# Patient Record
Sex: MALE | Race: OTHER | HISPANIC OR LATINO
[De-identification: names, ages, dates, MRNs, and addresses within clinical notes are randomized per-mention and may not be internally consistent; named-entity substitution may affect disease eponyms.]

---

## 2017-02-16 ENCOUNTER — TRANSCRIPTION ENCOUNTER (OUTPATIENT)
Age: 57
End: 2017-02-16

## 2019-12-27 VITALS
TEMPERATURE: 99 F | WEIGHT: 196.43 LBS | RESPIRATION RATE: 16 BRPM | HEART RATE: 103 BPM | DIASTOLIC BLOOD PRESSURE: 96 MMHG | OXYGEN SATURATION: 97 % | SYSTOLIC BLOOD PRESSURE: 179 MMHG

## 2019-12-27 RX ORDER — KETOROLAC TROMETHAMINE 30 MG/ML
30 SYRINGE (ML) INJECTION ONCE
Refills: 0 | Status: DISCONTINUED | OUTPATIENT
Start: 2019-12-27 | End: 2019-12-27

## 2019-12-27 RX ADMIN — Medication 30 MILLIGRAM(S): at 23:44

## 2019-12-27 RX ADMIN — Medication 100 MILLIGRAM(S): at 23:44

## 2019-12-27 NOTE — ED ADULT NURSE NOTE - PMH
Chronic obstructive pulmonary disease  COPD (chronic obstructive pulmonary disease)  Depressive disorder  Depressive disorder  Essential hypertension  HTN (hypertension)  Hepatitis C virus infection  Hepatitis C  Impotence of organic origin  ED (erectile dysfunction)  Obstructive sleep apnea syndrome  Obstructive sleep apnea  Type 2 diabetes mellitus  DM (diabetes mellitus)

## 2019-12-27 NOTE — ED ADULT NURSE NOTE - OBJECTIVE STATEMENT
pt c/o pain to L foot (heel area) and redness to bottom of foot along with swelling. 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 Pt noticed redness, pain, and swelling that started yesterday and became worse today

## 2019-12-28 ENCOUNTER — INPATIENT (INPATIENT)
Facility: HOSPITAL | Age: 59
LOS: 0 days | Discharge: HOME | DRG: 575 | End: 2019-12-28
Attending: STUDENT IN AN ORGANIZED HEALTH CARE EDUCATION/TRAINING PROGRAM | Admitting: STUDENT IN AN ORGANIZED HEALTH CARE EDUCATION/TRAINING PROGRAM
Payer: COMMERCIAL

## 2019-12-28 ENCOUNTER — TRANSCRIPTION ENCOUNTER (OUTPATIENT)
Age: 59
End: 2019-12-28

## 2019-12-28 VITALS
OXYGEN SATURATION: 98 % | RESPIRATION RATE: 18 BRPM | DIASTOLIC BLOOD PRESSURE: 69 MMHG | HEART RATE: 77 BPM | SYSTOLIC BLOOD PRESSURE: 123 MMHG | TEMPERATURE: 98 F

## 2019-12-28 DIAGNOSIS — I10 ESSENTIAL (PRIMARY) HYPERTENSION: ICD-10-CM

## 2019-12-28 DIAGNOSIS — J44.9 CHRONIC OBSTRUCTIVE PULMONARY DISEASE, UNSPECIFIED: ICD-10-CM

## 2019-12-28 DIAGNOSIS — R63.8 OTHER SYMPTOMS AND SIGNS CONCERNING FOOD AND FLUID INTAKE: ICD-10-CM

## 2019-12-28 DIAGNOSIS — Z91.89 OTHER SPECIFIED PERSONAL RISK FACTORS, NOT ELSEWHERE CLASSIFIED: ICD-10-CM

## 2019-12-28 DIAGNOSIS — L02.91 CUTANEOUS ABSCESS, UNSPECIFIED: ICD-10-CM

## 2019-12-28 DIAGNOSIS — Z29.9 ENCOUNTER FOR PROPHYLACTIC MEASURES, UNSPECIFIED: ICD-10-CM

## 2019-12-28 DIAGNOSIS — B19.20 UNSPECIFIED VIRAL HEPATITIS C WITHOUT HEPATIC COMA: ICD-10-CM

## 2019-12-28 DIAGNOSIS — E11.9 TYPE 2 DIABETES MELLITUS WITHOUT COMPLICATIONS: ICD-10-CM

## 2019-12-28 DIAGNOSIS — M79.5 RESIDUAL FOREIGN BODY IN SOFT TISSUE: ICD-10-CM

## 2019-12-28 LAB
ALBUMIN SERPL ELPH-MCNC: 3.5 G/DL — SIGNIFICANT CHANGE UP (ref 3.3–5)
ALP SERPL-CCNC: 120 U/L — SIGNIFICANT CHANGE UP (ref 40–120)
ALT FLD-CCNC: 14 U/L — SIGNIFICANT CHANGE UP (ref 10–45)
ANION GAP SERPL CALC-SCNC: 12 MMOL/L — SIGNIFICANT CHANGE UP (ref 5–17)
ANION GAP SERPL CALC-SCNC: 17 MMOL/L — SIGNIFICANT CHANGE UP (ref 5–17)
AST SERPL-CCNC: 22 U/L — SIGNIFICANT CHANGE UP (ref 10–40)
BASOPHILS # BLD AUTO: 0.03 K/UL — SIGNIFICANT CHANGE UP (ref 0–0.2)
BASOPHILS NFR BLD AUTO: 0.3 % — SIGNIFICANT CHANGE UP (ref 0–2)
BILIRUB SERPL-MCNC: 0.3 MG/DL — SIGNIFICANT CHANGE UP (ref 0.2–1.2)
BUN SERPL-MCNC: 23 MG/DL — SIGNIFICANT CHANGE UP (ref 7–23)
BUN SERPL-MCNC: 26 MG/DL — HIGH (ref 7–23)
CALCIUM SERPL-MCNC: 8.7 MG/DL — SIGNIFICANT CHANGE UP (ref 8.4–10.5)
CALCIUM SERPL-MCNC: 8.8 MG/DL — SIGNIFICANT CHANGE UP (ref 8.4–10.5)
CHLORIDE SERPL-SCNC: 100 MMOL/L — SIGNIFICANT CHANGE UP (ref 96–108)
CHLORIDE SERPL-SCNC: 103 MMOL/L — SIGNIFICANT CHANGE UP (ref 96–108)
CO2 SERPL-SCNC: 20 MMOL/L — LOW (ref 22–31)
CO2 SERPL-SCNC: 22 MMOL/L — SIGNIFICANT CHANGE UP (ref 22–31)
CREAT SERPL-MCNC: 1.18 MG/DL — SIGNIFICANT CHANGE UP (ref 0.5–1.3)
CREAT SERPL-MCNC: 1.22 MG/DL — SIGNIFICANT CHANGE UP (ref 0.5–1.3)
CRP SERPL-MCNC: 10.49 MG/DL — HIGH (ref 0–0.4)
EOSINOPHIL # BLD AUTO: 0.16 K/UL — SIGNIFICANT CHANGE UP (ref 0–0.5)
EOSINOPHIL NFR BLD AUTO: 1.5 % — SIGNIFICANT CHANGE UP (ref 0–6)
ERYTHROCYTE [SEDIMENTATION RATE] IN BLOOD: 53 MM/HR — HIGH
GLUCOSE BLDC GLUCOMTR-MCNC: 200 MG/DL — HIGH (ref 70–99)
GLUCOSE BLDC GLUCOMTR-MCNC: 220 MG/DL — HIGH (ref 70–99)
GLUCOSE BLDC GLUCOMTR-MCNC: 401 MG/DL — HIGH (ref 70–99)
GLUCOSE BLDC GLUCOMTR-MCNC: 84 MG/DL — SIGNIFICANT CHANGE UP (ref 70–99)
GLUCOSE SERPL-MCNC: 219 MG/DL — HIGH (ref 70–99)
GLUCOSE SERPL-MCNC: 394 MG/DL — HIGH (ref 70–99)
HCT VFR BLD CALC: 33.9 % — LOW (ref 39–50)
HCT VFR BLD CALC: 36.9 % — LOW (ref 39–50)
HCV AB S/CO SERPL IA: 10.82 S/CO — SIGNIFICANT CHANGE UP
HCV AB SERPL-IMP: REACTIVE
HGB BLD-MCNC: 11.5 G/DL — LOW (ref 13–17)
HGB BLD-MCNC: 12.5 G/DL — LOW (ref 13–17)
IMM GRANULOCYTES NFR BLD AUTO: 0.3 % — SIGNIFICANT CHANGE UP (ref 0–1.5)
LYMPHOCYTES # BLD AUTO: 1.81 K/UL — SIGNIFICANT CHANGE UP (ref 1–3.3)
LYMPHOCYTES # BLD AUTO: 16.7 % — SIGNIFICANT CHANGE UP (ref 13–44)
MAGNESIUM SERPL-MCNC: 2.2 MG/DL — SIGNIFICANT CHANGE UP (ref 1.6–2.6)
MCHC RBC-ENTMCNC: 29.4 PG — SIGNIFICANT CHANGE UP (ref 27–34)
MCHC RBC-ENTMCNC: 29.8 PG — SIGNIFICANT CHANGE UP (ref 27–34)
MCHC RBC-ENTMCNC: 33.9 GM/DL — SIGNIFICANT CHANGE UP (ref 32–36)
MCHC RBC-ENTMCNC: 33.9 GM/DL — SIGNIFICANT CHANGE UP (ref 32–36)
MCV RBC AUTO: 86.8 FL — SIGNIFICANT CHANGE UP (ref 80–100)
MCV RBC AUTO: 87.8 FL — SIGNIFICANT CHANGE UP (ref 80–100)
MONOCYTES # BLD AUTO: 0.74 K/UL — SIGNIFICANT CHANGE UP (ref 0–0.9)
MONOCYTES NFR BLD AUTO: 6.8 % — SIGNIFICANT CHANGE UP (ref 2–14)
NEUTROPHILS # BLD AUTO: 8.06 K/UL — HIGH (ref 1.8–7.4)
NEUTROPHILS NFR BLD AUTO: 74.4 % — SIGNIFICANT CHANGE UP (ref 43–77)
NRBC # BLD: 0 /100 WBCS — SIGNIFICANT CHANGE UP (ref 0–0)
NRBC # BLD: 0 /100 WBCS — SIGNIFICANT CHANGE UP (ref 0–0)
PLATELET # BLD AUTO: 217 K/UL — SIGNIFICANT CHANGE UP (ref 150–400)
PLATELET # BLD AUTO: 225 K/UL — SIGNIFICANT CHANGE UP (ref 150–400)
POTASSIUM SERPL-MCNC: 3.9 MMOL/L — SIGNIFICANT CHANGE UP (ref 3.5–5.3)
POTASSIUM SERPL-MCNC: 4.4 MMOL/L — SIGNIFICANT CHANGE UP (ref 3.5–5.3)
POTASSIUM SERPL-SCNC: 3.9 MMOL/L — SIGNIFICANT CHANGE UP (ref 3.5–5.3)
POTASSIUM SERPL-SCNC: 4.4 MMOL/L — SIGNIFICANT CHANGE UP (ref 3.5–5.3)
PROT SERPL-MCNC: 6.9 G/DL — SIGNIFICANT CHANGE UP (ref 6–8.3)
RBC # BLD: 3.86 M/UL — LOW (ref 4.2–5.8)
RBC # BLD: 4.25 M/UL — SIGNIFICANT CHANGE UP (ref 4.2–5.8)
RBC # FLD: 12.3 % — SIGNIFICANT CHANGE UP (ref 10.3–14.5)
RBC # FLD: 12.5 % — SIGNIFICANT CHANGE UP (ref 10.3–14.5)
SODIUM SERPL-SCNC: 137 MMOL/L — SIGNIFICANT CHANGE UP (ref 135–145)
SODIUM SERPL-SCNC: 137 MMOL/L — SIGNIFICANT CHANGE UP (ref 135–145)
WBC # BLD: 10.83 K/UL — HIGH (ref 3.8–10.5)
WBC # BLD: 9.56 K/UL — SIGNIFICANT CHANGE UP (ref 3.8–10.5)
WBC # FLD AUTO: 10.83 K/UL — HIGH (ref 3.8–10.5)
WBC # FLD AUTO: 9.56 K/UL — SIGNIFICANT CHANGE UP (ref 3.8–10.5)

## 2019-12-28 PROCEDURE — 85027 COMPLETE CBC AUTOMATED: CPT

## 2019-12-28 PROCEDURE — 86140 C-REACTIVE PROTEIN: CPT

## 2019-12-28 PROCEDURE — 82962 GLUCOSE BLOOD TEST: CPT

## 2019-12-28 PROCEDURE — 99283 EMERGENCY DEPT VISIT LOW MDM: CPT

## 2019-12-28 PROCEDURE — 99285 EMERGENCY DEPT VISIT HI MDM: CPT | Mod: 25

## 2019-12-28 PROCEDURE — 83036 HEMOGLOBIN GLYCOSYLATED A1C: CPT

## 2019-12-28 PROCEDURE — 73620 X-RAY EXAM OF FOOT: CPT

## 2019-12-28 PROCEDURE — 85025 COMPLETE CBC W/AUTO DIFF WBC: CPT

## 2019-12-28 PROCEDURE — 73630 X-RAY EXAM OF FOOT: CPT | Mod: 26

## 2019-12-28 PROCEDURE — 36415 COLL VENOUS BLD VENIPUNCTURE: CPT

## 2019-12-28 PROCEDURE — 87521 HEPATITIS C PROBE&RVRS TRNSC: CPT

## 2019-12-28 PROCEDURE — 80048 BASIC METABOLIC PNL TOTAL CA: CPT

## 2019-12-28 PROCEDURE — 87040 BLOOD CULTURE FOR BACTERIA: CPT

## 2019-12-28 PROCEDURE — 73620 X-RAY EXAM OF FOOT: CPT | Mod: 26,LT

## 2019-12-28 PROCEDURE — 96375 TX/PRO/DX INJ NEW DRUG ADDON: CPT

## 2019-12-28 PROCEDURE — 83735 ASSAY OF MAGNESIUM: CPT

## 2019-12-28 PROCEDURE — 73630 X-RAY EXAM OF FOOT: CPT

## 2019-12-28 PROCEDURE — 80053 COMPREHEN METABOLIC PANEL: CPT

## 2019-12-28 PROCEDURE — 90471 IMMUNIZATION ADMIN: CPT

## 2019-12-28 PROCEDURE — 90715 TDAP VACCINE 7 YRS/> IM: CPT

## 2019-12-28 PROCEDURE — 96365 THER/PROPH/DIAG IV INF INIT: CPT

## 2019-12-28 PROCEDURE — 85652 RBC SED RATE AUTOMATED: CPT

## 2019-12-28 PROCEDURE — 73630 X-RAY EXAM OF FOOT: CPT | Mod: 26,LT

## 2019-12-28 PROCEDURE — 86803 HEPATITIS C AB TEST: CPT

## 2019-12-28 RX ORDER — KETOROLAC TROMETHAMINE 30 MG/ML
30 SYRINGE (ML) INJECTION ONCE
Refills: 0 | Status: DISCONTINUED | OUTPATIENT
Start: 2019-12-28 | End: 2019-12-28

## 2019-12-28 RX ORDER — TETANUS TOXOID, REDUCED DIPHTHERIA TOXOID AND ACELLULAR PERTUSSIS VACCINE, ADSORBED 5; 2.5; 8; 8; 2.5 [IU]/.5ML; [IU]/.5ML; UG/.5ML; UG/.5ML; UG/.5ML
0.5 SUSPENSION INTRAMUSCULAR ONCE
Refills: 0 | Status: COMPLETED | OUTPATIENT
Start: 2019-12-28 | End: 2019-12-28

## 2019-12-28 RX ORDER — AZTREONAM 2 G
2 VIAL (EA) INJECTION
Qty: 28 | Refills: 0
Start: 2019-12-28 | End: 2020-01-03

## 2019-12-28 RX ORDER — INSULIN LISPRO 100/ML
VIAL (ML) SUBCUTANEOUS
Refills: 0 | Status: DISCONTINUED | OUTPATIENT
Start: 2019-12-28 | End: 2019-12-28

## 2019-12-28 RX ORDER — ENOXAPARIN SODIUM 100 MG/ML
40 INJECTION SUBCUTANEOUS EVERY 24 HOURS
Refills: 0 | Status: DISCONTINUED | OUTPATIENT
Start: 2019-12-28 | End: 2019-12-28

## 2019-12-28 RX ORDER — INSULIN HUMAN 100 [IU]/ML
8 INJECTION, SOLUTION SUBCUTANEOUS ONCE
Refills: 0 | Status: COMPLETED | OUTPATIENT
Start: 2019-12-28 | End: 2019-12-28

## 2019-12-28 RX ORDER — AZTREONAM 2 G
1 VIAL (EA) INJECTION
Qty: 14 | Refills: 0
Start: 2019-12-28 | End: 2020-01-03

## 2019-12-28 RX ORDER — LOSARTAN POTASSIUM 100 MG/1
0 TABLET, FILM COATED ORAL
Qty: 0 | Refills: 0 | DISCHARGE

## 2019-12-28 RX ORDER — PIPERACILLIN AND TAZOBACTAM 4; .5 G/20ML; G/20ML
3.38 INJECTION, POWDER, LYOPHILIZED, FOR SOLUTION INTRAVENOUS EVERY 6 HOURS
Refills: 0 | Status: DISCONTINUED | OUTPATIENT
Start: 2019-12-28 | End: 2019-12-28

## 2019-12-28 RX ORDER — VANCOMYCIN HCL 1 G
1000 VIAL (EA) INTRAVENOUS EVERY 12 HOURS
Refills: 0 | Status: DISCONTINUED | OUTPATIENT
Start: 2019-12-28 | End: 2019-12-28

## 2019-12-28 RX ORDER — CIPROFLOXACIN LACTATE 400MG/40ML
1 VIAL (ML) INTRAVENOUS
Qty: 14 | Refills: 0
Start: 2019-12-28 | End: 2020-01-03

## 2019-12-28 RX ORDER — MORPHINE SULFATE 50 MG/1
1 CAPSULE, EXTENDED RELEASE ORAL ONCE
Refills: 0 | Status: DISCONTINUED | OUTPATIENT
Start: 2019-12-28 | End: 2019-12-28

## 2019-12-28 RX ORDER — ASPIRIN/CALCIUM CARB/MAGNESIUM 324 MG
81 TABLET ORAL DAILY
Refills: 0 | Status: DISCONTINUED | OUTPATIENT
Start: 2019-12-28 | End: 2019-12-28

## 2019-12-28 RX ORDER — INSULIN ASPART 100 [IU]/ML
0 INJECTION, SOLUTION SUBCUTANEOUS
Qty: 0 | Refills: 0 | DISCHARGE

## 2019-12-28 RX ORDER — LOSARTAN POTASSIUM 100 MG/1
100 TABLET, FILM COATED ORAL DAILY
Refills: 0 | Status: DISCONTINUED | OUTPATIENT
Start: 2019-12-28 | End: 2019-12-28

## 2019-12-28 RX ORDER — INFLUENZA VIRUS VACCINE 15; 15; 15; 15 UG/.5ML; UG/.5ML; UG/.5ML; UG/.5ML
0.5 SUSPENSION INTRAMUSCULAR ONCE
Refills: 0 | Status: DISCONTINUED | OUTPATIENT
Start: 2019-12-28 | End: 2019-12-28

## 2019-12-28 RX ORDER — IPRATROPIUM/ALBUTEROL SULFATE 18-103MCG
3 AEROSOL WITH ADAPTER (GRAM) INHALATION EVERY 6 HOURS
Refills: 0 | Status: DISCONTINUED | OUTPATIENT
Start: 2019-12-28 | End: 2019-12-28

## 2019-12-28 RX ORDER — AMLODIPINE BESYLATE 2.5 MG/1
10 TABLET ORAL DAILY
Refills: 0 | Status: DISCONTINUED | OUTPATIENT
Start: 2019-12-28 | End: 2019-12-28

## 2019-12-28 RX ORDER — ACETAMINOPHEN 500 MG
650 TABLET ORAL EVERY 6 HOURS
Refills: 0 | Status: DISCONTINUED | OUTPATIENT
Start: 2019-12-28 | End: 2019-12-28

## 2019-12-28 RX ORDER — INSULIN GLARGINE 100 [IU]/ML
20 INJECTION, SOLUTION SUBCUTANEOUS AT BEDTIME
Refills: 0 | Status: DISCONTINUED | OUTPATIENT
Start: 2019-12-28 | End: 2019-12-28

## 2019-12-28 RX ORDER — INSULIN GLARGINE 100 [IU]/ML
0 INJECTION, SOLUTION SUBCUTANEOUS
Qty: 0 | Refills: 0 | DISCHARGE

## 2019-12-28 RX ADMIN — PIPERACILLIN AND TAZOBACTAM 200 GRAM(S): 4; .5 INJECTION, POWDER, LYOPHILIZED, FOR SOLUTION INTRAVENOUS at 05:39

## 2019-12-28 RX ADMIN — ENOXAPARIN SODIUM 40 MILLIGRAM(S): 100 INJECTION SUBCUTANEOUS at 05:38

## 2019-12-28 RX ADMIN — Medication 81 MILLIGRAM(S): at 11:48

## 2019-12-28 RX ADMIN — Medication 30 MILLIGRAM(S): at 00:15

## 2019-12-28 RX ADMIN — AMLODIPINE BESYLATE 10 MILLIGRAM(S): 2.5 TABLET ORAL at 05:39

## 2019-12-28 RX ADMIN — INSULIN HUMAN 8 UNIT(S): 100 INJECTION, SOLUTION SUBCUTANEOUS at 02:00

## 2019-12-28 RX ADMIN — TETANUS TOXOID, REDUCED DIPHTHERIA TOXOID AND ACELLULAR PERTUSSIS VACCINE, ADSORBED 0.5 MILLILITER(S): 5; 2.5; 8; 8; 2.5 SUSPENSION INTRAMUSCULAR at 00:54

## 2019-12-28 RX ADMIN — PIPERACILLIN AND TAZOBACTAM 200 GRAM(S): 4; .5 INJECTION, POWDER, LYOPHILIZED, FOR SOLUTION INTRAVENOUS at 11:49

## 2019-12-28 RX ADMIN — Medication 4: at 06:55

## 2019-12-28 RX ADMIN — Medication 600 MILLIGRAM(S): at 00:01

## 2019-12-28 RX ADMIN — Medication 30 MILLIGRAM(S): at 10:36

## 2019-12-28 RX ADMIN — Medication 30 MILLIGRAM(S): at 11:56

## 2019-12-28 RX ADMIN — Medication 100 MILLIGRAM(S): at 04:12

## 2019-12-28 RX ADMIN — Medication 650 MILLIGRAM(S): at 04:11

## 2019-12-28 RX ADMIN — Medication 2: at 12:29

## 2019-12-28 RX ADMIN — Medication 250 MILLIGRAM(S): at 05:38

## 2019-12-28 RX ADMIN — LOSARTAN POTASSIUM 100 MILLIGRAM(S): 100 TABLET, FILM COATED ORAL at 05:43

## 2019-12-28 RX ADMIN — MORPHINE SULFATE 1 MILLIGRAM(S): 50 CAPSULE, EXTENDED RELEASE ORAL at 07:53

## 2019-12-28 RX ADMIN — MORPHINE SULFATE 1 MILLIGRAM(S): 50 CAPSULE, EXTENDED RELEASE ORAL at 07:38

## 2019-12-28 NOTE — CONSULT NOTE ADULT - SUBJECTIVE AND OBJECTIVE BOX
Attending: Arline KendallM PMHx DM who presents with a complaint of pain, swelling, and redness of the left heel after stepping on a foreign body on 12/25.  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       Review of systems negative except per HPI    PAST MEDICAL & SURGICAL HISTORY:  Impotence of organic origin: ED (erectile dysfunction)  Essential hypertension: HTN (hypertension)  Type 2 diabetes mellitus: DM (diabetes mellitus)  Chronic obstructive pulmonary disease: COPD (chronic obstructive pulmonary disease)  Obstructive sleep apnea syndrome: Obstructive sleep apnea  Hepatitis C virus infection: Hepatitis C  Depressive disorder: Depressive disorder  Other postprocedural status: History of penile implant  Other postprocedural status: History of tonsillectomy  Other postprocedural status: 2014    Home Medications:  Lantus:  (27 Dec 2019 23:11)  losartan:  (27 Dec 2019 23:11)  Lyrica:  (27 Dec 2019 23:11)  NovoLOG:  (27 Dec 2019 23:11)    Allergies    No Known Allergies    Intolerances    Originally Entered as [Other - Mild] reaction to [Other][cantalope [ throat itchy and irritated]] (Other)    FAMILY HISTORY:    Social History:       LABS                        12.5   10.83 )-----------( 225      ( 27 Dec 2019 23:30 )             36.9               Vital Signs Last 24 Hrs  T(C): 37 (27 Dec 2019 22:46), Max: 37 (27 Dec 2019 22:46)  T(F): 98.6 (27 Dec 2019 22:46), Max: 98.6 (27 Dec 2019 22:46)  HR: 103 (27 Dec 2019 22:46) (103 - 103)  BP: 179/96 (27 Dec 2019 22:46) (179/96 - 179/96)  BP(mean): --  RR: 16 (27 Dec 2019 22:46) (16 - 16)  SpO2: 97% (27 Dec 2019 22:46) (97% - 97%)    PHYSICAL EXAM  General: NAD, AA0x3    Lower Extremity Focused:  Vasc:  Derm:  Neuro:  MSK:     RADIOLOGY Attending: Arline KendallM PMHx DM who presents with a complaint of pain, swelling, and redness of the left heel after stepping on a foreign body on 12/25.  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 Pus noted to be coming from the site in the ED.      Review of systems negative except per HPI    PAST MEDICAL & SURGICAL HISTORY:  Impotence of organic origin: ED (erectile dysfunction)  Essential hypertension: HTN (hypertension)  Type 2 diabetes mellitus: DM (diabetes mellitus)  Chronic obstructive pulmonary disease: COPD (chronic obstructive pulmonary disease)  Obstructive sleep apnea syndrome: Obstructive sleep apnea  Hepatitis C virus infection: Hepatitis C  Depressive disorder: Depressive disorder  Other postprocedural status: History of penile implant  Other postprocedural status: History of tonsillectomy  Other postprocedural status: 2014    Home Medications:  Lantus:  (27 Dec 2019 23:11)  losartan:  (27 Dec 2019 23:11)  Lyrica:  (27 Dec 2019 23:11)  NovoLOG:  (27 Dec 2019 23:11)    Allergies    No Known Allergies    Intolerances    Originally Entered as [Other - Mild] reaction to [Other][cantalope [ throat itchy and irritated]] (Other)    FAMILY HISTORY:    Social History:       LABS                        12.5   10.83 )-----------( 225      ( 27 Dec 2019 23:30 )             36.9               Vital Signs Last 24 Hrs  T(C): 37 (27 Dec 2019 22:46), Max: 37 (27 Dec 2019 22:46)  T(F): 98.6 (27 Dec 2019 22:46), Max: 98.6 (27 Dec 2019 22:46)  HR: 103 (27 Dec 2019 22:46) (103 - 103)  BP: 179/96 (27 Dec 2019 22:46) (179/96 - 179/96)  BP(mean): --  RR: 16 (27 Dec 2019 22:46) (16 - 16)  SpO2: 97% (27 Dec 2019 22:46) (97% - 97%)    PHYSICAL EXAM  General: NAD, AA0x3    Lower Extremity Focused:  Vasc:  Derm:  Neuro:  MSK:     RADIOLOGY Attending: Arline KendallM PMHx DM who presents with a complaint of pain, swelling, and redness of the left heel after stepping on a foreign body on 12/25.  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 Pus noted to be coming from the site in the ED. Endorses chills and mild fever while at home yesterday. Tachycardia to 102 in ED. WBC @ 10.83. Denies N/V/CP/SOB.      Review of systems negative except per HPI    PAST MEDICAL & SURGICAL HISTORY:  Impotence of organic origin: ED (erectile dysfunction)  Essential hypertension: HTN (hypertension)  Type 2 diabetes mellitus: DM (diabetes mellitus)  Chronic obstructive pulmonary disease: COPD (chronic obstructive pulmonary disease)  Obstructive sleep apnea syndrome: Obstructive sleep apnea  Hepatitis C virus infection: Hepatitis C  Depressive disorder: Depressive disorder  Other postprocedural status: History of penile implant  Other postprocedural status: History of tonsillectomy  Other postprocedural status: 2014    Home Medications:  Lantus:  (27 Dec 2019 23:11)  losartan:  (27 Dec 2019 23:11)  Lyrica:  (27 Dec 2019 23:11)  NovoLOG:  (27 Dec 2019 23:11)    Allergies    No Known Allergies    Intolerances    Originally Entered as [Other - Mild] reaction to [Other][cantalope [ throat itchy and irritated]] (Other)    FAMILY HISTORY:    Social History:       LABS                        12.5   10.83 )-----------( 225      ( 27 Dec 2019 23:30 )             36.9               Vital Signs Last 24 Hrs  T(C): 37 (27 Dec 2019 22:46), Max: 37 (27 Dec 2019 22:46)  T(F): 98.6 (27 Dec 2019 22:46), Max: 98.6 (27 Dec 2019 22:46)  HR: 103 (27 Dec 2019 22:46) (103 - 103)  BP: 179/96 (27 Dec 2019 22:46) (179/96 - 179/96)  BP(mean): --  RR: 16 (27 Dec 2019 22:46) (16 - 16)  SpO2: 97% (27 Dec 2019 22:46) (97% - 97%)    PHYSICAL EXAM  General: NAD, AA0x3    Lower Extremity Focused:  Vasc: DP & PT 2/4 B/L  Derm: Pinpoint opening of plantar L foot in the heel w/ small amount of purulent drainage <1cc.   Neuro: Diminished sensation B/L  MSK: Ambulatory    RADIOLOGY  (Wet Read): Foreign body noted to plantar left foot measuring <0.5 cm in length superficial near the calcaneus. Attending: Arline KendallM PMHx DM who presents with a complaint of pain, swelling, and redness of the left heel after stepping on a foreign body on 12/25.  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 Pus noted to be coming from the site in the ED. Endorses chills and mild fever while at home yesterday. Tachycardia to 102 in ED. WBC @ 10.83. Denies N/V/CP/SOB.      Review of systems negative except per HPI    PAST MEDICAL & SURGICAL HISTORY:  Impotence of organic origin: ED (erectile dysfunction)  Essential hypertension: HTN (hypertension)  Type 2 diabetes mellitus: DM (diabetes mellitus)  Chronic obstructive pulmonary disease: COPD (chronic obstructive pulmonary disease)  Obstructive sleep apnea syndrome: Obstructive sleep apnea  Hepatitis C virus infection: Hepatitis C  Depressive disorder: Depressive disorder  Other postprocedural status: History of penile implant  Other postprocedural status: History of tonsillectomy  Other postprocedural status: 2014    Home Medications:  Lantus:  (27 Dec 2019 23:11)  losartan:  (27 Dec 2019 23:11)  Lyrica:  (27 Dec 2019 23:11)  NovoLOG:  (27 Dec 2019 23:11)    Allergies    No Known Allergies    Intolerances    Originally Entered as [Other - Mild] reaction to [Other][cantalope [ throat itchy and irritated]] (Other)    FAMILY HISTORY:    Social History:       LABS                        12.5   10.83 )-----------( 225      ( 27 Dec 2019 23:30 )             36.9               Vital Signs Last 24 Hrs  T(C): 37 (27 Dec 2019 22:46), Max: 37 (27 Dec 2019 22:46)  T(F): 98.6 (27 Dec 2019 22:46), Max: 98.6 (27 Dec 2019 22:46)  HR: 103 (27 Dec 2019 22:46) (103 - 103)  BP: 179/96 (27 Dec 2019 22:46) (179/96 - 179/96)  BP(mean): --  RR: 16 (27 Dec 2019 22:46) (16 - 16)  SpO2: 97% (27 Dec 2019 22:46) (97% - 97%)    PHYSICAL EXAM  General: NAD, AA0x3    Lower Extremity Focused:  Vasc: DP & PT 2/4 B/L  Derm: Pinpoint opening of plantar L foot in the heel w/ small amount of purulent drainage <1cc.   Neuro: Diminished sensation B/L  MSK: Ambulatory    RADIOLOGY  (Wet Read): Foreign body (2mm and linear) noted to plantar left foot superficial near the calcaneus.

## 2019-12-28 NOTE — ED PROVIDER NOTE - DIAGNOSTIC INTERPRETATION
ER Physician: Henry  INTERPRETATION:  no acute fracture; no soft tissue swelling noted; normal bony alignment. small FB to heel

## 2019-12-28 NOTE — DISCHARGE NOTE PROVIDER - HOSPITAL COURSE
59 y.o M hx of diabetes, htn, copd, hep C s/p harvoni tx    p/w L heel pain after stepping on wire noted to have a foreign  body with localized erythema and swelling    Abscess left heel 2/2 foreign object: pinpoint opening of plantar L foot in the heel w/ small amount of purulent drainage <1ccs/p I & d with podiatry, post-procedure x-ray confirmed removal of foreign body. Received Tdap vaccine and vanc and zosyn. Podiatry evaluate pt and recommended keeping abscess covered. Pt discharged stable condition w/ Bactrim (MRSA coverage) and Cipro (pseudomonas coverage as diabetic) for 7 days.    DM2: Pt w/ A1C 12, and finger stick in 400’s. Reports taking max 80U lantus at home and worsening vision and neuropathy. Provided pt w/ referral to endocrine clinic    New Meds: Bactrim (2 dbl strength tabs qd) + Cipro (750mg q12) for 7 days    Exam to follow up: L heel exam, eye exam, diabetic foot exam    Labs to follow up: A1C in 3 months and normal finger sticks 59 y.o M hx of diabetes, htn, copd, hep C s/p harvoni tx    p/w L heel pain after stepping on wire noted to have a foreign  body with localized erythema and swelling    Abscess left heel 2/2 foreign object: pinpoint opening of plantar L foot in the heel w/ small amount of purulent drainage <1ccs/p I & d with podiatry, post-procedure x-ray confirmed removal of foreign body. Received Tdap vaccine and vanc and zosyn. Podiatry evaluate pt and recommended keeping abscess covered. Pt discharged stable condition w/ Bactrim (MRSA coverage) and Cipro (pseudomonas coverage as diabetic) for 7 days.    DM2: Pt w/ A1C 12, and finger stick in 400’s. Reports taking max 80U lantus at home and worsening vision and neuropathy. Provided pt w/ referral to endocrine clinic    New Meds: Bactrim (2 dbl strength tabs q12) + Cipro (750mg q12) for 7 days    Exam to follow up: L heel exam, eye exam, diabetic foot exam    Labs to follow up: A1C in 3 months and normal finger sticks

## 2019-12-28 NOTE — H&P ADULT - PROBLEM SELECTOR PLAN 1
Patient reports stepping on wire on  enriqueta day . He attempted to pull it out however was unable to do so , he then took a surgical knife and attempted to cut out piece of wire. He noticed worsening pain and localized swelling on left heel. Patient reports mild fever , however denies chills, no oozing from site.   XRAY done showed a  Foreign body (2mm and linear) on the left foot superficial near the calcaneus.  Patient seen by podiatry and was noted to have pinpoint opening of plantar L foot in the heel w/ small amount of purulent drainage <1cc and localized swelling of the foot with mild erythema   Patient now s/p drainage of abscess of plantar heel w/ <1cc of purulence noted.  - post-procedure x-ray confirmed removal of foreign body  -continue with abx as below  -f/u podiatry recs  -DSD

## 2019-12-28 NOTE — ED PROVIDER NOTE - CLINICAL SUMMARY MEDICAL DECISION MAKING FREE TEXT BOX
stepped on piece of wire, likely now with abscess and surrounding cellulitis  -check labs  -xray  -clinda  -podiatry consulted

## 2019-12-28 NOTE — ED PROVIDER NOTE - PROGRESS NOTE DETAILS
podiatry at bedside to perform I&D.  will admit to medicine for continued wound care and monitoring of infection.

## 2019-12-28 NOTE — H&P ADULT - PROBLEM SELECTOR PLAN 2
Exam with pinpoint opening of plantar L foot in the heel w/ small amount of purulent drainage <1cc and localized swelling of the foot with mild erythema .   Pt is now s/p drainage of abscess of plantar heel w/ <1cc of purulence noted. As per note " incision down to subcutaneous tissue with likely removal of foreign body as confirmed w/ post-procedure x-ray"  Podiatry on board, f/u recs  -As per podiatry given elevated crp advised to continue with broad spectrum abx for now. Patient is not meeting any sepsis criteria and can likely deescalate in am. He reports some fever at home however no documented fever, does not meet sirs  -f/u am esr, crp   -f/u blood cx

## 2019-12-28 NOTE — PROCEDURE NOTE - ADDITIONAL PROCEDURE DETAILS
Drainage of abscess of plantar heel w/ <1cc of purulence noted. Incision down to subcutaneous tissue with likely removal of foreign body as confirmed w/ post-procedure x-ray.

## 2019-12-28 NOTE — H&P ADULT - NSICDXPASTSURGICALHX_GEN_ALL_CORE_FT
PAST SURGICAL HISTORY:  Other postprocedural status 2014    Other postprocedural status History of tonsillectomy    Other postprocedural status History of penile implant

## 2019-12-28 NOTE — H&P ADULT - HISTORY OF PRESENT ILLNESS
Patient with hx of diabetes, htn, copd, hep C, c/o L heel pain. Patient reports stepping on wire on  enriqueta day . He attempted to pull it out however was unable to do so , he then took a surgical knife and attempted to cut out piece of wire. He noticed worsening pain and localized swelling on left heel. Patient reports mild fever , however denies chills, no oozing from site.   Upon arrival to ED vitals : afebrile, hr 103, bp 179/96, rr 16, satting well on ra  Labs showing: wbc 10.8, hg 12.5, glucose 394, crp 9.14, HBA1C 12.3  XRAY done showed a  Foreign body (2mm and linear) noted to plantar left foot superficial near the calcaneus.  Patient seen by podiatry and was noted to have pinpoint opening of plantar L foot in the heel w/ small amount of purulent drainage <1cc. Seen by podiatry in the ed, s/p I & d done with attempt to extract wire  Patient admitted for further management and broad spectrum abx Patient with hx of diabetes, htn, copd, hep C, s/p harvoni treatment , ED, c/o L heel pain. Patient reports stepping on wire on  enriqueta day . He attempted to pull it out however was unable to do so , he then took a surgical knife and attempted to cut out piece of wire. He noticed worsening pain and localized swelling on left heel. Patient reports mild fever , however denies chills, no oozing from site. All other ros negative   Upon arrival to ED vitals : afebrile, hr 103, bp 179/96, rr 16, satting well on ra  Labs showing: wbc 10.8, hg 12.5, glucose 394, crp 9.14, HBA1C 12.3  XRAY done showed a  Foreign body (2mm and linear) noted to plantar left foot superficial near the calcaneus.  Patient seen by podiatry and was noted to have pinpoint opening of plantar L foot in the heel w/ small amount of purulent drainage <1cc. Seen by podiatry in the ed, s/p I & d done with removal of foreign body   Patient admitted for further management and broad spectrum abx

## 2019-12-28 NOTE — DISCHARGE NOTE NURSING/CASE MANAGEMENT/SOCIAL WORK - NSDCFUADDAPPT_GEN_ALL_CORE_FT
Denny Nunn, VINEET  Podiatry  [address], [place] [address]  [phone]  Please call to make an appointment for next week    [facility]  [address], [place] [id number]  Phone: [phone]

## 2019-12-28 NOTE — H&P ADULT - NSHPLABSRESULTS_GEN_ALL_CORE
.  LABS:                         12.5   10.83 )-----------( 225      ( 27 Dec 2019 23:30 )             36.9     12-27    137  |  100  |  23  ----------------------------<  394<H>  4.4   |  20<L>  |  1.22    Ca    8.8      27 Dec 2019 23:30    TPro  6.9  /  Alb  3.5  /  TBili  0.3  /  DBili  x   /  AST  22  /  ALT  14  /  AlkPhos  120  12-27                  RADIOLOGY, EKG & ADDITIONAL TESTS: Reviewed.

## 2019-12-28 NOTE — H&P ADULT - ASSESSMENT
Patient with hx of diabetes, htn, copd, hep C, c/o L heel pain noted to have a foreign  body with localized erythema and swelling, s/p I & d with podiatry admitted for further management

## 2019-12-28 NOTE — PROGRESS NOTE ADULT - SUBJECTIVE AND OBJECTIVE BOX
Patient is a 59y old  Male who presents with a chief complaint of foot wound (28 Dec 2019 02:25)      INTERVAL HPI/ OVERNIGHT EVENTS: Pt seen and examined bedside. NAEO. Improved leukocytosis. Afebrile o/n.      LABS                        11.5   9.56  )-----------( 217      ( 28 Dec 2019 06:30 )             33.9     12-28    137  |  103  |  26<H>  ----------------------------<  219<H>  3.9   |  22  |  1.18    Ca    8.7      28 Dec 2019 06:30  Mg     2.2     12-28    TPro  6.9  /  Alb  3.5  /  TBili  0.3  /  DBili  x   /  AST  22  /  ALT  14  /  AlkPhos  120  12-27      ESR: 53  CRP: --  12-28 @ 06:30  ESR: 53  CRP: --  12-27 @ 23:30    ICU Vital Signs Last 24 Hrs  T(C): 36.6 (28 Dec 2019 05:28), Max: 37 (27 Dec 2019 22:46)  T(F): 97.9 (28 Dec 2019 05:28), Max: 98.6 (27 Dec 2019 22:46)  HR: 85 (28 Dec 2019 05:28) (81 - 103)  BP: 123/76 (28 Dec 2019 05:28) (121/63 - 179/96)  BP(mean): --  ABP: --  ABP(mean): --  RR: 17 (28 Dec 2019 05:28) (16 - 17)  SpO2: 96% (28 Dec 2019 05:28) (96% - 98%)    PHYSICAL EXAM  Lower Extremity Focused  Vasc: DP & PT 2/4 B/L  Derm: R foot ulcer (2x2cm) down to subcutaneous tissue w/ red granular base. No pus/purulence/crepitus. Mild periwound erythema.  Neuro: Diminished  MSK: Ambulatory

## 2019-12-28 NOTE — H&P ADULT - NSHPPHYSICALEXAM_GEN_ALL_CORE
.  VITAL SIGNS:  T(C): 36.7 (12-28-19 @ 02:05), Max: 37 (12-27-19 @ 22:46)  T(F): 98 (12-28-19 @ 02:05), Max: 98.6 (12-27-19 @ 22:46)  HR: 81 (12-28-19 @ 02:05) (81 - 103)  BP: 137/63 (12-28-19 @ 02:05) (137/63 - 179/96)  BP(mean): --  RR: 16 (12-28-19 @ 02:05) (16 - 16)  SpO2: 98% (12-28-19 @ 02:05) (97% - 98%)  Wt(kg): --    PHYSICAL EXAM:    Constitutional: WDWN resting comfortably in bed; NAD  Head: NC/AT  Eyes: PERRL, EOMI, anicteric sclera  ENT: no nasal discharge; uvula midline, no oropharyngeal erythema or exudates; MMM  Neck: supple; no JVD or thyromegaly  Respiratory: CTA B/L; no W/R/R, no retractions  Cardiac: +S1/S2; RRR; no M/R/G; PMI non-displaced  Gastrointestinal: abdomen soft, NT/ND; no rebound or guarding; +BSx4  Back: spine midline, no bony tenderness or step-offs; no CVAT B/L  Extremities: WWP, no clubbing or cyanosis; no peripheral edema  Musculoskeletal: NROM x4; no joint swelling, tenderness or erythema  Dermatologic: Left foot with  pinpoint opening of plantar L foot in the heel , s/p I& D , mild erythema noted.   Neurologic: AAOx3; CNII-XII grossly intact; no focal deficits .  VITAL SIGNS:  T(C): 36.7 (12-28-19 @ 02:05), Max: 37 (12-27-19 @ 22:46)  T(F): 98 (12-28-19 @ 02:05), Max: 98.6 (12-27-19 @ 22:46)  HR: 81 (12-28-19 @ 02:05) (81 - 103)  BP: 137/63 (12-28-19 @ 02:05) (137/63 - 179/96)  BP(mean): --  RR: 16 (12-28-19 @ 02:05) (16 - 16)  SpO2: 98% (12-28-19 @ 02:05) (97% - 98%)  Wt(kg): --    PHYSICAL EXAM:    Constitutional: WDWN resting comfortably in bed; NAD  Head: NC/AT  Eyes: PERRL, EOMI, anicteric sclera  ENT: no nasal discharge; uvula midline, no oropharyngeal erythema or exudates; MMM  Neck: supple; no JVD or thyromegaly  Respiratory: CTA B/L; no W/R/R, no retractions  Cardiac: +S1/S2; RRR; no M/R/G; PMI non-displaced  Gastrointestinal: abdomen soft, NT/ND; no rebound or guarding; +BSx4  Back: spine midline, no bony tenderness or step-offs; no CVAT B/L  Extremities: WWP, no clubbing or cyanosis; no peripheral edema  Musculoskeletal: NROM x4; no joint swelling, tenderness or erythema  Dermatologic: Left foot with  pinpoint opening of plantar L foot in the heel , mild erythema noted.   Neurologic: AAOx3; CNII-XII grossly intact; no focal deficits

## 2019-12-28 NOTE — DISCHARGE NOTE PROVIDER - CARE PROVIDER_API CALL
Bonnie Nunn (VINEET)  Orthopaedic Surgery  [address] [place] [address]  Phone: [phone]  Fax: [phone]  Follow Up Time:

## 2019-12-28 NOTE — DISCHARGE NOTE PROVIDER - NSDCCPCAREPLAN_GEN_ALL_CORE_FT
PRINCIPAL DISCHARGE DIAGNOSIS  Diagnosis: Foot infection  Assessment and Plan of Treatment: You were found to have a foreign object in your left foot which was from when you stepped on the wire. The object was removed and your were put on antibiotics. Please continue to take Bactrim (2 dbl strength tabs every day) + Ciprofloxacin (750mg every 12 hours) for 7 days. If you experience fever, chills, chest pain, shortness of breath, worsening foot pain, inability to walk, make sure to return to the emergency room. Please follow up with w/ podiatrist Dr. nguyễn next week for him to evaluate your infection.      SECONDARY DISCHARGE DIAGNOSES  Diagnosis: Diabetes mellitus  Assessment and Plan of Treatment: You were found to have an A1C of 12.3 and blood sugars in the 400's. Your diabetic medical management may not be ideal. Please follow up with our endocrinology clinic to have an endocrinologist review your regiment and adjust medications to better control your blood sugars. Diabetes can worsen your neuropathy, vision, kidney function, and hurt the brain and heart as well. If you experience fever, chills, nausea, vomiting, abdominal pain, dizziness, weakness, make sure to return to the emergency room. PRINCIPAL DISCHARGE DIAGNOSIS  Diagnosis: Foot infection  Assessment and Plan of Treatment: You were found to have a foreign object in your left foot which was from when you stepped on the wire. The object was removed and your were put on antibiotics. Please continue to take Bactrim (2 dbl strength tabs every day) + Ciprofloxacin (750mg every 12 hours) for 7 days. If you experience fever, chills, chest pain, shortness of breath, worsening foot pain, inability to walk, make sure to return to the emergency room. Please follow up with w/ podiatrist Dr. nguyễn next week for him to evaluate your infection. Please clean infection w/ over the counter bacitracin and daily dressing changes with gauze.      SECONDARY DISCHARGE DIAGNOSES  Diagnosis: Diabetes mellitus  Assessment and Plan of Treatment: You were found to have an A1C of 12.3 and blood sugars in the 400's. Your diabetic medical management may not be ideal. Please follow up with our endocrinology clinic to have an endocrinologist review your regiment and adjust medications to better control your blood sugars. Diabetes can worsen your neuropathy, vision, kidney function, and hurt the brain and heart as well. If you experience fever, chills, nausea, vomiting, abdominal pain, dizziness, weakness, make sure to return to the emergency room. PRINCIPAL DISCHARGE DIAGNOSIS  Diagnosis: Foot infection  Assessment and Plan of Treatment: You were found to have a foreign object in your left foot which was from when you stepped on the wire. The object was removed and your were put on antibiotics. Please continue to take Bactrim (2 dbl strength tabs every every 12 hours) + Ciprofloxacin (750mg every 12 hours) for 7 days. If you experience fever, chills, chest pain, shortness of breath, worsening foot pain, inability to walk, make sure to return to the emergency room. Please follow up with w/ podiatrist Dr. nguyễn next week for him to evaluate your infection. Please clean infection w/ over the counter bacitracin and daily dressing changes with gauze.      SECONDARY DISCHARGE DIAGNOSES  Diagnosis: Diabetes mellitus  Assessment and Plan of Treatment: You were found to have an A1C of 12.3 and blood sugars in the 400's. Your diabetic medical management may not be ideal. Please follow up with our endocrinology clinic to have an endocrinologist review your regiment and adjust medications to better control your blood sugars. Diabetes can worsen your neuropathy, vision, kidney function, and hurt the brain and heart as well. If you experience fever, chills, nausea, vomiting, abdominal pain, dizziness, weakness, make sure to return to the emergency room.

## 2019-12-28 NOTE — DISCHARGE NOTE NURSING/CASE MANAGEMENT/SOCIAL WORK - PATIENT PORTAL LINK FT
You can access the FollowMyHealth Patient Portal offered by [facility] by registering at the following website: [email or web address]. By joining [employer]’s FollowMyHealth portal, you will also be able to view your health information using other applications (apps) compatible with our system.

## 2019-12-28 NOTE — ED PROVIDER NOTE - PHYSICAL EXAMINATION
L foot - +swelling, redness, fluctuance to L heel, puncture wound with pus drainage  1+DP  sensation intact

## 2019-12-28 NOTE — ED PROVIDER NOTE - PSH
Other postprocedural status  History of penile implant  Other postprocedural status  History of tonsillectomy  Other postprocedural status  2014

## 2019-12-28 NOTE — DISCHARGE NOTE PROVIDER - NSDCMRMEDTOKEN_GEN_ALL_CORE_FT
amLODIPine 10 mg oral tablet: 1 tab(s) orally once a day  aspirin 81 mg oral tablet: 1 tab(s) orally once a day  Lantus 100 units/mL subcutaneous solution:   losartan 100 mg oral tablet: 1 tab(s) orally once a day  Lyrica 100 mg oral capsule: 1 cap(s) orally 3 times a day  NovoLOG FlexPen 100 units/mL injectable solution: amLODIPine 10 mg oral tablet: 1 tab(s) orally once a day  aspirin 81 mg oral tablet: 1 tab(s) orally once a day  Bactrim  mg-160 mg oral tablet: 1 tab(s) orally 2 times a day   ciprofloxacin 750 mg oral tablet: 1 tab(s) orally every 12 hours   Lantus 100 units/mL subcutaneous solution:   losartan 100 mg oral tablet: 1 tab(s) orally once a day  Lyrica 100 mg oral capsule: 1 cap(s) orally 3 times a day  NovoLOG FlexPen 100 units/mL injectable solution: amLODIPine 10 mg oral tablet: 1 tab(s) orally once a day  aspirin 81 mg oral tablet: 1 tab(s) orally once a day  Bactrim  mg-160 mg oral tablet: 2 tab(s) orally every 12 hours   Bactrim  mg-160 mg oral tablet: 1 tab(s) orally 2 times a day   ciprofloxacin 750 mg oral tablet: 1 tab(s) orally every 12 hours   Lantus 100 units/mL subcutaneous solution:   losartan 100 mg oral tablet: 1 tab(s) orally once a day  Lyrica 100 mg oral capsule: 1 cap(s) orally 3 times a day  NovoLOG FlexPen 100 units/mL injectable solution: amLODIPine 10 mg oral tablet: 1 tab(s) orally once a day  aspirin 81 mg oral tablet: 1 tab(s) orally once a day  Bactrim  mg-160 mg oral tablet: 2 tab(s) orally every 12 hours   ciprofloxacin 750 mg oral tablet: 1 tab(s) orally every 12 hours   Lantus 100 units/mL subcutaneous solution:   losartan 100 mg oral tablet: 1 tab(s) orally once a day  Lyrica 100 mg oral capsule: 1 cap(s) orally 3 times a day  NovoLOG FlexPen 100 units/mL injectable solution:

## 2019-12-28 NOTE — PROGRESS NOTE ADULT - ASSESSMENT
59M PMHx DM, HTN who presents with a complaint of pain, swelling, and redness of the left heel 2/2 abscess from foreign body. CRP @ 9.13. Pt clinically improved this morning w/ down trending WBC, afebrile o/n, and regular HR.    Recommendations:  -Abx per medicine   -Rx Iodosorb for topical use at home  -Endocrinology f/u outpatient  -Wound care @ home: DSD (gauze & kerlix) w/ Iodosorb  -Plan discussed w/ Dr. Nunn  Patient should follow up with Dr. Camron Nunn within 1 week of discharge.    Office information:          [place] Address- [address], [place] [address] Phone: [phone]         [place] Address- [address] [address] Phone: [phone] 59M PMHx DM, HTN who presents with a complaint of pain, swelling, and redness of the left heel 2/2 abscess from foreign body. CRP @ 9.13. Pt clinically improved this morning w/ down trending WBC, afebrile o/n, and regular HR.    Recommendations:  -Abx per medicine   -Endocrinology f/u outpatient  -Wound care @ home: DSD (gauze & kerlix) w/ Bacitracin or Triple Abx ointment  -Plan discussed w/ Dr. Nunn  Patient should follow up with Dr. Camron Nunn within 1 week of discharge.    Office information:          [place] Address- [address], [place] [address] Phone: [phone]         [place] Address- [address], [place] [address] Phone: [phone]

## 2019-12-28 NOTE — CONSULT NOTE ADULT - ASSESSMENT
59M PMHx DM who presents with a complaint of pain, swelling, and redness of the left heel    Recommendations: 59M PMHx DM, HTN who presents with a complaint of pain, swelling, and redness of the left heel 2/2 abscess from foreign body. Concern for sepsis due to reported fever at home and tachycardia in ED. CRP significantly elevated @ 9.13.    Recommendations:  -Bedside I&D performed in the ED. See procedure note  -No culture obtained due to lack of purulence after incision and high likelihood of contamination if one were to be taken  -Broad spectrum abx for now, but can likely deescalate if pt improves by tomorrow  -Post procedure x-ray w/o signs of foreign body  -F/u Blood Cx  -F/u ESR  -Podiatry will follow  -Plan discussed w/ Dr. Nunn 59M PMHx DM, HTN who presents with a complaint of pain, swelling, and redness of the left heel 2/2 abscess from foreign body. Concern for sepsis due to reported fever at home and tachycardia in ED. CRP significantly elevated @ 9.13.    Recommendations:  -Bedside I&D performed in the ED. See procedure note  -No culture obtained due to lack of purulence after incision and high likelihood of contamination if one were to be taken  -Broad spectrum abx for now, but can likely deescalate if pt improves by tomorrow  -Post procedure x-ray w/o signs of foreign body  -F/u Blood Cx  -F/u ESR  -DSD  -Podiatry will follow  -Plan discussed w/ Dr. Nunn 59M PMHx DM, HTN who presents with a complaint of pain, swelling, and redness of the left heel 2/2 abscess from foreign body. Concern for sepsis due to reported fever at home and tachycardia in ED. CRP significantly elevated @ 9.13.    Recommendations:  -Bedside I&D performed in the ED. See procedure note  -No culture obtained due to lack of purulence after incision and high likelihood of contamination if one were to be taken  -Broad spectrum abx ok for now, but can likely deescalate  -Post procedure x-ray w/o signs of foreign body  -F/u Blood Cx  -F/u ESR  -DSD  -Podiatry will follow  -Plan discussed w/ Dr. Nunn

## 2019-12-28 NOTE — H&P ADULT - NSICDXPASTMEDICALHX_GEN_ALL_CORE_FT
PAST MEDICAL HISTORY:  Chronic obstructive pulmonary disease COPD (chronic obstructive pulmonary disease)    Depressive disorder Depressive disorder    Essential hypertension HTN (hypertension)    Hepatitis C virus infection Hepatitis C    Impotence of organic origin ED (erectile dysfunction)    Obstructive sleep apnea syndrome Obstructive sleep apnea    Type 2 diabetes mellitus DM (diabetes mellitus)

## 2019-12-28 NOTE — H&P ADULT - PROBLEM SELECTOR PLAN 3
patient with hx of diabetes, with an HBA1C of 12.3 and complications (reported neuropathy )   Patient is on Novolog prn ? and reports being on as much as 80 lantus which he takes on different times of the day. He reports taking it at noon somedays , other days he takes at 2 pm. He is unclear exactly of his regimen so hesitant to start him on Lantus 80 at this point  will weight dose and start on Lantus 20 at bedtime and consult endo in am for further recs.

## 2019-12-31 DIAGNOSIS — N52.9 MALE ERECTILE DYSFUNCTION, UNSPECIFIED: ICD-10-CM

## 2019-12-31 DIAGNOSIS — G47.33 OBSTRUCTIVE SLEEP APNEA (ADULT) (PEDIATRIC): ICD-10-CM
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
## 2019-12-31 DIAGNOSIS — S90.852A SUPERFICIAL FOREIGN BODY, LEFT FOOT, INITIAL ENCOUNTER: ICD-10-CM

## 2019-12-31 DIAGNOSIS — I10 ESSENTIAL (PRIMARY) HYPERTENSION: ICD-10-CM

## 2019-12-31 DIAGNOSIS — F32.9 MAJOR DEPRESSIVE DISORDER, SINGLE EPISODE, UNSPECIFIED: ICD-10-CM

## 2019-12-31 DIAGNOSIS — M79.673 PAIN IN UNSPECIFIED FOOT: ICD-10-CM
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
## 2019-12-31 DIAGNOSIS — J44.9 CHRONIC OBSTRUCTIVE PULMONARY DISEASE, UNSPECIFIED: ICD-10-CM

## 2019-12-31 DIAGNOSIS — E11.9 TYPE 2 DIABETES MELLITUS WITHOUT COMPLICATIONS: ICD-10-CM

## 2019-12-31 DIAGNOSIS — B19.20 UNSPECIFIED VIRAL HEPATITIS C WITHOUT HEPATIC COMA: ICD-10-CM

## 2019-12-31 DIAGNOSIS — L02.612 CUTANEOUS ABSCESS OF LEFT FOOT: ICD-10-CM

## 2019-12-31 DIAGNOSIS — F17.200 NICOTINE DEPENDENCE, UNSPECIFIED, UNCOMPLICATED: ICD-10-CM

## 2020-01-02 LAB
CULTURE RESULTS: SIGNIFICANT CHANGE UP
CULTURE RESULTS: SIGNIFICANT CHANGE UP
SPECIMEN SOURCE: SIGNIFICANT CHANGE UP
SPECIMEN SOURCE: SIGNIFICANT CHANGE UP

## 2020-01-03 ENCOUNTER — APPOINTMENT (OUTPATIENT)
Dept: ENDOCRINOLOGY | Facility: CLINIC | Age: 60
End: 2020-01-03

## 2020-02-28 ENCOUNTER — EMERGENCY (EMERGENCY)
Facility: HOSPITAL | Age: 60
LOS: 1 days | Discharge: HOME | End: 2020-02-28
Attending: EMERGENCY MEDICINE | Admitting: EMERGENCY MEDICINE
Payer: MEDICAID

## 2020-02-28 VITALS
RESPIRATION RATE: 18 BRPM | OXYGEN SATURATION: 98 % | TEMPERATURE: 98 F | SYSTOLIC BLOOD PRESSURE: 179 MMHG | DIASTOLIC BLOOD PRESSURE: 101 MMHG | HEART RATE: 81 BPM

## 2020-02-28 VITALS
HEART RATE: 93 BPM | HEIGHT: 69 IN | DIASTOLIC BLOOD PRESSURE: 122 MMHG | WEIGHT: 195.99 LBS | SYSTOLIC BLOOD PRESSURE: 222 MMHG | TEMPERATURE: 99 F | OXYGEN SATURATION: 98 % | RESPIRATION RATE: 18 BRPM

## 2020-02-28 DIAGNOSIS — Z79.82 LONG TERM (CURRENT) USE OF ASPIRIN: ICD-10-CM

## 2020-02-28 DIAGNOSIS — E11.9 TYPE 2 DIABETES MELLITUS WITHOUT COMPLICATIONS: ICD-10-CM

## 2020-02-28 DIAGNOSIS — K62.89 OTHER SPECIFIED DISEASES OF ANUS AND RECTUM: ICD-10-CM

## 2020-02-28 DIAGNOSIS — Z79.4 LONG TERM (CURRENT) USE OF INSULIN: ICD-10-CM

## 2020-02-28 DIAGNOSIS — I10 ESSENTIAL (PRIMARY) HYPERTENSION: ICD-10-CM

## 2020-02-28 DIAGNOSIS — Z79.899 OTHER LONG TERM (CURRENT) DRUG THERAPY: ICD-10-CM

## 2020-02-28 DIAGNOSIS — J44.9 CHRONIC OBSTRUCTIVE PULMONARY DISEASE, UNSPECIFIED: ICD-10-CM

## 2020-02-28 LAB
ALBUMIN SERPL ELPH-MCNC: 3.6 G/DL — SIGNIFICANT CHANGE UP (ref 3.3–5)
ALP SERPL-CCNC: SIGNIFICANT CHANGE UP U/L (ref 40–120)
ALT FLD-CCNC: SIGNIFICANT CHANGE UP U/L (ref 10–45)
ANION GAP SERPL CALC-SCNC: 13 MMOL/L — SIGNIFICANT CHANGE UP (ref 5–17)
APTT BLD: 26.2 SEC — LOW (ref 27.5–36.3)
AST SERPL-CCNC: SIGNIFICANT CHANGE UP U/L (ref 10–40)
BASOPHILS # BLD AUTO: 0.02 K/UL — SIGNIFICANT CHANGE UP (ref 0–0.2)
BASOPHILS NFR BLD AUTO: 0.3 % — SIGNIFICANT CHANGE UP (ref 0–2)
BILIRUB SERPL-MCNC: 0.2 MG/DL — SIGNIFICANT CHANGE UP (ref 0.2–1.2)
BLD GP AB SCN SERPL QL: NEGATIVE — SIGNIFICANT CHANGE UP
BUN SERPL-MCNC: 13 MG/DL — SIGNIFICANT CHANGE UP (ref 7–23)
CALCIUM SERPL-MCNC: 9.3 MG/DL — SIGNIFICANT CHANGE UP (ref 8.4–10.5)
CHLORIDE SERPL-SCNC: 99 MMOL/L — SIGNIFICANT CHANGE UP (ref 96–108)
CO2 SERPL-SCNC: 22 MMOL/L — SIGNIFICANT CHANGE UP (ref 22–31)
CREAT SERPL-MCNC: 0.81 MG/DL — SIGNIFICANT CHANGE UP (ref 0.5–1.3)
EOSINOPHIL # BLD AUTO: 0.26 K/UL — SIGNIFICANT CHANGE UP (ref 0–0.5)
EOSINOPHIL NFR BLD AUTO: 3.5 % — SIGNIFICANT CHANGE UP (ref 0–6)
GLUCOSE SERPL-MCNC: 390 MG/DL — HIGH (ref 70–99)
HCT VFR BLD CALC: 43.9 % — SIGNIFICANT CHANGE UP (ref 39–50)
HGB BLD-MCNC: 14.7 G/DL — SIGNIFICANT CHANGE UP (ref 13–17)
IMM GRANULOCYTES NFR BLD AUTO: 0.3 % — SIGNIFICANT CHANGE UP (ref 0–1.5)
INR BLD: 0.89 — SIGNIFICANT CHANGE UP (ref 0.88–1.16)
LYMPHOCYTES # BLD AUTO: 2.28 K/UL — SIGNIFICANT CHANGE UP (ref 1–3.3)
LYMPHOCYTES # BLD AUTO: 31.1 % — SIGNIFICANT CHANGE UP (ref 13–44)
MCHC RBC-ENTMCNC: 29.2 PG — SIGNIFICANT CHANGE UP (ref 27–34)
MCHC RBC-ENTMCNC: 33.5 GM/DL — SIGNIFICANT CHANGE UP (ref 32–36)
MCV RBC AUTO: 87.3 FL — SIGNIFICANT CHANGE UP (ref 80–100)
MONOCYTES # BLD AUTO: 0.47 K/UL — SIGNIFICANT CHANGE UP (ref 0–0.9)
MONOCYTES NFR BLD AUTO: 6.4 % — SIGNIFICANT CHANGE UP (ref 2–14)
NEUTROPHILS # BLD AUTO: 4.28 K/UL — SIGNIFICANT CHANGE UP (ref 1.8–7.4)
NEUTROPHILS NFR BLD AUTO: 58.4 % — SIGNIFICANT CHANGE UP (ref 43–77)
NRBC # BLD: 0 /100 WBCS — SIGNIFICANT CHANGE UP (ref 0–0)
PLATELET # BLD AUTO: 235 K/UL — SIGNIFICANT CHANGE UP (ref 150–400)
POTASSIUM SERPL-MCNC: SIGNIFICANT CHANGE UP MMOL/L (ref 3.5–5.3)
POTASSIUM SERPL-SCNC: SIGNIFICANT CHANGE UP MMOL/L (ref 3.5–5.3)
PROT SERPL-MCNC: 7.3 G/DL — SIGNIFICANT CHANGE UP (ref 6–8.3)
PROTHROM AB SERPL-ACNC: 10.1 SEC — SIGNIFICANT CHANGE UP (ref 10–12.9)
RBC # BLD: 5.03 M/UL — SIGNIFICANT CHANGE UP (ref 4.2–5.8)
RBC # FLD: 12.2 % — SIGNIFICANT CHANGE UP (ref 10.3–14.5)
RH IG SCN BLD-IMP: POSITIVE — SIGNIFICANT CHANGE UP
SODIUM SERPL-SCNC: 134 MMOL/L — LOW (ref 135–145)
TROPONIN T SERPL-MCNC: <0.01 NG/ML — SIGNIFICANT CHANGE UP (ref 0–0.01)
WBC # BLD: 7.33 K/UL — SIGNIFICANT CHANGE UP (ref 3.8–10.5)
WBC # FLD AUTO: 7.33 K/UL — SIGNIFICANT CHANGE UP (ref 3.8–10.5)

## 2020-02-28 PROCEDURE — 74177 CT ABD & PELVIS W/CONTRAST: CPT | Mod: 26

## 2020-02-28 PROCEDURE — 85025 COMPLETE CBC W/AUTO DIFF WBC: CPT

## 2020-02-28 PROCEDURE — 85730 THROMBOPLASTIN TIME PARTIAL: CPT

## 2020-02-28 PROCEDURE — 99284 EMERGENCY DEPT VISIT MOD MDM: CPT

## 2020-02-28 PROCEDURE — 85610 PROTHROMBIN TIME: CPT

## 2020-02-28 PROCEDURE — 74177 CT ABD & PELVIS W/CONTRAST: CPT

## 2020-02-28 PROCEDURE — 84484 ASSAY OF TROPONIN QUANT: CPT

## 2020-02-28 PROCEDURE — 80053 COMPREHEN METABOLIC PANEL: CPT

## 2020-02-28 PROCEDURE — 86901 BLOOD TYPING SEROLOGIC RH(D): CPT

## 2020-02-28 PROCEDURE — 86850 RBC ANTIBODY SCREEN: CPT

## 2020-02-28 PROCEDURE — 93005 ELECTROCARDIOGRAM TRACING: CPT

## 2020-02-28 PROCEDURE — 36415 COLL VENOUS BLD VENIPUNCTURE: CPT

## 2020-02-28 PROCEDURE — 71045 X-RAY EXAM CHEST 1 VIEW: CPT

## 2020-02-28 PROCEDURE — 99285 EMERGENCY DEPT VISIT HI MDM: CPT

## 2020-02-28 PROCEDURE — 93010 ELECTROCARDIOGRAM REPORT: CPT

## 2020-02-28 PROCEDURE — 71045 X-RAY EXAM CHEST 1 VIEW: CPT | Mod: 26

## 2020-02-28 NOTE — ED PROVIDER NOTE - OBJECTIVE STATEMENT
59M pmh DM, htn, copd, hep C, s/p harvoni treatment , p/w rectal pain and cp. 59M pmh DM, htn, copd, hep C, s/p harvoni treatment , p/w rectal pain and cp. States started having some rectal discomfort w/ passing BM past ~3wks, acutely worsening in past week, pain is intense, sharp, nonradiating. Assoc w/ BRBPR during bm and at wiping. Also noted cp last night, L sided, pressure like, resolved after pain resolved. No f/c, no n/v, no sob.     Uncle w/ hx of colon cancer.

## 2020-02-28 NOTE — ED ADULT NURSE NOTE - OBJECTIVE STATEMENT
Patient alert and oriented x 3 came c/o rectal bleeding like bright red , rectal pain on and off for 2 weeks got worse last night . Also c/o intermitent chest pain but no sob nor palpitations . No abdominal pain , nausea nor vomiting . Noted with elevated bp in triage ,stated that he run out of htn meds for 1 week now . No headache nor dizziness. Not on thinner , in stable condition , awaiting to be seen ,will continue to monitor .

## 2020-02-28 NOTE — ED PROVIDER NOTE - PATIENT PORTAL LINK FT
You can access the FollowMyHealth Patient Portal offered by [facility] by registering at the following website: [email or web address]. By joining [email or web address]’s FollowMyHealth portal, you will also be able to view your health information using other applications (apps) compatible with our system.

## 2020-02-28 NOTE — ED ADULT NURSE NOTE - NSIMPLEMENTINTERV_GEN_ALL_ED
Implemented All Universal Safety Interventions:  [place] to call system. Call bell, personal items and telephone within reach. Instruct patient to call for assistance. Room bathroom lighting operational. Non-slip footwear when patient is off stretcher. Physically safe environment: no spills, clutter or unnecessary equipment. Stretcher in lowest position, wheels locked, appropriate side rails in place.

## 2020-02-28 NOTE — ED PROVIDER NOTE - NS_EDPROVIDERDISPOUSERTYPE_ED_A_ED
MD notified of pt report that prn pain and itching medication not relieving symptoms. MD assessing orders.    Attending Attestation (For Attendings USE Only)...

## 2020-02-28 NOTE — ED PROVIDER NOTE - PHYSICAL EXAMINATION
VITAL SIGNS: I have reviewed nursing notes and confirm.  CONSTITUTIONAL: Well-developed; well-nourished; in no acute distress.  SKIN: Skin is warm and dry, no acute rash.  HEAD: Normocephalic; atraumatic.  EYES:  EOM intact; conjunctiva and sclera clear.  ENT: No nasal discharge; airway clear.  NECK: Supple; Voluntary FROM  CARD: No rubs appreciated, Regular rate and rhythm.  RESP: No wheezes, no rales. No respiratory distress  ABD: Soft; non-distended; non-tender; no rebound or guarding  rectal: no hemorrhoids, no lesions, no blood, +brown stool  EXT: Normal ROM. No cyanosis or edema.  NEURO: Alert, oriented. Grossly unremarkable.  PSYCH: Cooperative, appropriate.

## 2020-02-28 NOTE — ED PROVIDER NOTE - CLINICAL SUMMARY MEDICAL DECISION MAKING FREE TEXT BOX
59M pmh DM, htn, copd, hep C, s/p harvoni treatment , p/w rectal pain and cp. States started having some rectal discomfort w/ passing BM past ~3wks, acutely worsening in past week, pain is intense, sharp, nonradiating. Assoc w/ BRBPR during bm and at wiping. Also noted cp last night, L sided, pressure like, resolved after pain resolved. No f/c, no n/v, no sob. Uncle w/ hx of colon cancer. Exam noted for min distress,   Concern hemorroids, consider internal hemoroids, less likely fistula, consider rectal tear. 59M pmh DM, htn, copd, hep C, s/p harvoni treatment , p/w rectal pain and cp. States started having some rectal discomfort w/ passing BM past ~3wks, acutely worsening in past week, pain is intense, sharp, nonradiating. Assoc w/ BRBPR during bm and at wiping. Also noted cp last night, L sided, pressure like, resolved after pain resolved. No f/c, no n/v, no sob. Uncle w/ hx of colon cancer. Exam noted for min distress,   Concern hemorroids, consider internal hemoroids, less likely fistula, consider rectal tear. No sig finding on CT to explain symptoms. Reviewed results w/ family, and pt, has plan for f/u w/ pmd. Feeling much improved, no acute life threatening illness. Pt seeking discharge. 59M pmh DM, htn, copd, hep C, s/p harvoni treatment , p/w rectal pain and cp. States started having some rectal discomfort w/ passing BM past ~3wks, acutely worsening in past week, pain is intense, sharp, nonradiating. Assoc w/ BRBPR during bm and at wiping. Also noted cp last night, L sided, pressure like, resolved after pain resolved. No f/c, no n/v, no sob. Uncle w/ hx of colon cancer. Exam noted for min distress, no sig findings on rectal. Concern hemorroids, consider internal hemoroids, less likely fistula, consider rectal tear. No sig finding on CT to explain symptoms. Reviewed results w/ family, and pt, has plan for f/u w/ pmd. Feeling much improved, no acute life threatening illness. Pt seeking discharge.

## 2020-02-28 NOTE — ED PROVIDER NOTE - NSFOLLOWUPINSTRUCTIONS_ED_ALL_ED_FT
Immediately return to the Emergency Department or call 911 for any high fever, trouble breathing, severe vomiting, worsening pain, or any other concerns.    You must follow up with your primary care physician.   You must maintain soft stools until symptoms resolve. You are encouraged to have plenty of fiber in your diet.     Abdominal Pain    Many things can cause abdominal pain. Many times, abdominal pain is not caused by a disease and will improve without treatment. Your health care provider will do a physical exam to determine if there is a dangerous cause of your pain; blood tests and imaging may help determine the cause of your pain. However, in many cases, no cause may be found and you may need further testing as an outpatient. Monitor your abdominal pain for any changes.     SEEK IMMEDIATE MEDICAL CARE IF YOU HAVE ANY OF THE FOLLOWING SYMPTOMS: worsening abdominal pain, uncontrollable vomiting, profuse diarrhea, inability to have bowel movements or pass gas, black or bloody stools, fever accompanying chest pain or back pain, or fainting. These symptoms may represent a serious problem that is an emergency. Do not wait to see if the symptoms will go away. Get medical help right away. Call 911 and do not drive yourself to the hospital.

## 2020-02-28 NOTE — ED ADULT NURSE NOTE - CHPI ED NUR SYMPTOMS NEG
no vomiting/no diarrhea/no dysuria/no hematuria/no nausea/no burning urination/no chills/no fever/no abdominal distension

## 2020-04-01 ENCOUNTER — OUTPATIENT (OUTPATIENT)
Dept: OUTPATIENT SERVICES | Facility: HOSPITAL | Age: 60
LOS: 1 days | End: 2020-04-01
Payer: MEDICAID

## 2020-04-01 PROCEDURE — G9001: CPT

## 2020-04-17 DIAGNOSIS — Z71.89 OTHER SPECIFIED COUNSELING: ICD-10-CM

## 2020-05-07 NOTE — ED ADULT NURSE NOTE - CHPI ED NUR CONTEXT2
----- Message from [place] sent at 5/7/2020 11:54 AM CDT -----  Contact: patient  Type:  Patient Returning Call    Who Called:  patient  Who Left Message for Patient:  Not sure  Does the patient know what this is regarding?:  yes  Best Call Back Number:  [phone]  Additional Information:  Requesting a call back   unknown

## 2021-09-01 ENCOUNTER — OUTPATIENT (OUTPATIENT)
Dept: OUTPATIENT SERVICES | Facility: HOSPITAL | Age: 61
LOS: 1 days | End: 2021-09-01
Payer: MEDICAID

## 2021-09-20 ENCOUNTER — INPATIENT (INPATIENT)
Facility: HOSPITAL | Age: 61
LOS: 1 days | Discharge: HOME | DRG: 247 | End: 2021-09-22
Attending: HOSPITALIST | Admitting: HOSPITALIST
Payer: MEDICAID

## 2021-09-20 VITALS
TEMPERATURE: 98 F | HEIGHT: 69 IN | WEIGHT: 190.04 LBS | SYSTOLIC BLOOD PRESSURE: 113 MMHG | DIASTOLIC BLOOD PRESSURE: 71 MMHG | OXYGEN SATURATION: 98 % | HEART RATE: 72 BPM | RESPIRATION RATE: 18 BRPM

## 2021-09-20 DIAGNOSIS — N17.9 ACUTE KIDNEY FAILURE, UNSPECIFIED: ICD-10-CM

## 2021-09-20 DIAGNOSIS — F32.9 MAJOR DEPRESSIVE DISORDER, SINGLE EPISODE, UNSPECIFIED: ICD-10-CM

## 2021-09-20 DIAGNOSIS — E11.9 TYPE 2 DIABETES MELLITUS WITHOUT COMPLICATIONS: ICD-10-CM

## 2021-09-20 DIAGNOSIS — I10 ESSENTIAL (PRIMARY) HYPERTENSION: ICD-10-CM

## 2021-09-20 DIAGNOSIS — E78.5 HYPERLIPIDEMIA, UNSPECIFIED: ICD-10-CM

## 2021-09-20 DIAGNOSIS — I24.9 ACUTE ISCHEMIC HEART DISEASE, UNSPECIFIED: ICD-10-CM

## 2021-09-20 DIAGNOSIS — Z87.09 PERSONAL HISTORY OF OTHER DISEASES OF THE RESPIRATORY SYSTEM: ICD-10-CM

## 2021-09-20 LAB
ANION GAP SERPL CALC-SCNC: 12 MMOL/L — SIGNIFICANT CHANGE UP (ref 5–17)
BASOPHILS # BLD AUTO: 0.03 K/UL — SIGNIFICANT CHANGE UP (ref 0–0.2)
BASOPHILS NFR BLD AUTO: 0.3 % — SIGNIFICANT CHANGE UP (ref 0–2)
BUN SERPL-MCNC: 24 MG/DL — HIGH (ref 7–23)
CALCIUM SERPL-MCNC: 9.7 MG/DL — SIGNIFICANT CHANGE UP (ref 8.4–10.5)
CHLORIDE SERPL-SCNC: 105 MMOL/L — SIGNIFICANT CHANGE UP (ref 96–108)
CO2 SERPL-SCNC: 22 MMOL/L — SIGNIFICANT CHANGE UP (ref 22–31)
CREAT ?TM UR-MCNC: 157 MG/DL — SIGNIFICANT CHANGE UP
CREAT SERPL-MCNC: 1.7 MG/DL — HIGH (ref 0.5–1.3)
D DIMER BLD IA.RAPID-MCNC: 182 NG/ML DDU — SIGNIFICANT CHANGE UP
EOSINOPHIL # BLD AUTO: 0.25 K/UL — SIGNIFICANT CHANGE UP (ref 0–0.5)
EOSINOPHIL NFR BLD AUTO: 2.4 % — SIGNIFICANT CHANGE UP (ref 0–6)
GLUCOSE BLDC GLUCOMTR-MCNC: 474 MG/DL — CRITICAL HIGH (ref 70–99)
GLUCOSE SERPL-MCNC: 156 MG/DL — HIGH (ref 70–99)
HCT VFR BLD CALC: 38.9 % — LOW (ref 39–50)
HGB BLD-MCNC: 13.4 G/DL — SIGNIFICANT CHANGE UP (ref 13–17)
IMM GRANULOCYTES NFR BLD AUTO: 0.5 % — SIGNIFICANT CHANGE UP (ref 0–1.5)
LYMPHOCYTES # BLD AUTO: 1.89 K/UL — SIGNIFICANT CHANGE UP (ref 1–3.3)
LYMPHOCYTES # BLD AUTO: 18.3 % — SIGNIFICANT CHANGE UP (ref 13–44)
MCHC RBC-ENTMCNC: 29.3 PG — SIGNIFICANT CHANGE UP (ref 27–34)
MCHC RBC-ENTMCNC: 34.4 GM/DL — SIGNIFICANT CHANGE UP (ref 32–36)
MCV RBC AUTO: 85.1 FL — SIGNIFICANT CHANGE UP (ref 80–100)
MONOCYTES # BLD AUTO: 0.61 K/UL — SIGNIFICANT CHANGE UP (ref 0–0.9)
MONOCYTES NFR BLD AUTO: 5.9 % — SIGNIFICANT CHANGE UP (ref 2–14)
NEUTROPHILS # BLD AUTO: 7.47 K/UL — HIGH (ref 1.8–7.4)
NEUTROPHILS NFR BLD AUTO: 72.6 % — SIGNIFICANT CHANGE UP (ref 43–77)
NRBC # BLD: 0 /100 WBCS — SIGNIFICANT CHANGE UP (ref 0–0)
PLATELET # BLD AUTO: 237 K/UL — SIGNIFICANT CHANGE UP (ref 150–400)
POTASSIUM SERPL-MCNC: 4.5 MMOL/L — SIGNIFICANT CHANGE UP (ref 3.5–5.3)
POTASSIUM SERPL-SCNC: 4.5 MMOL/L — SIGNIFICANT CHANGE UP (ref 3.5–5.3)
RBC # BLD: 4.57 M/UL — SIGNIFICANT CHANGE UP (ref 4.2–5.8)
RBC # FLD: 12 % — SIGNIFICANT CHANGE UP (ref 10.3–14.5)
SARS-COV-2 RNA SPEC QL NAA+PROBE: NEGATIVE — SIGNIFICANT CHANGE UP
SODIUM SERPL-SCNC: 139 MMOL/L — SIGNIFICANT CHANGE UP (ref 135–145)
SODIUM UR-SCNC: <20 MMOL/L — SIGNIFICANT CHANGE UP
TROPONIN T SERPL-MCNC: 0.01 NG/ML — SIGNIFICANT CHANGE UP (ref 0–0.01)
TROPONIN T SERPL-MCNC: 0.01 NG/ML — SIGNIFICANT CHANGE UP (ref 0–0.01)
UUN UR-MCNC: 310 MG/DL — SIGNIFICANT CHANGE UP
WBC # BLD: 10.3 K/UL — SIGNIFICANT CHANGE UP (ref 3.8–10.5)
WBC # FLD AUTO: 10.3 K/UL — SIGNIFICANT CHANGE UP (ref 3.8–10.5)

## 2021-09-20 PROCEDURE — 71045 X-RAY EXAM CHEST 1 VIEW: CPT | Mod: 26

## 2021-09-20 PROCEDURE — 93010 ELECTROCARDIOGRAM REPORT: CPT | Mod: 76

## 2021-09-20 PROCEDURE — 99223 1ST HOSP IP/OBS HIGH 75: CPT

## 2021-09-20 PROCEDURE — 99285 EMERGENCY DEPT VISIT HI MDM: CPT | Mod: 25

## 2021-09-20 RX ORDER — INSULIN LISPRO 100/ML
6 VIAL (ML) SUBCUTANEOUS
Refills: 0 | Status: DISCONTINUED | OUTPATIENT
Start: 2021-09-20 | End: 2021-09-21

## 2021-09-20 RX ORDER — ALBUTEROL 90 UG/1
2 AEROSOL, METERED ORAL
Qty: 0 | Refills: 0 | DISCHARGE

## 2021-09-20 RX ORDER — AMLODIPINE BESYLATE 2.5 MG/1
10 TABLET ORAL DAILY
Refills: 0 | Status: DISCONTINUED | OUTPATIENT
Start: 2021-09-20 | End: 2021-09-22

## 2021-09-20 RX ORDER — ASPIRIN/CALCIUM CARB/MAGNESIUM 324 MG
81 TABLET ORAL DAILY
Refills: 0 | Status: DISCONTINUED | OUTPATIENT
Start: 2021-09-20 | End: 2021-09-22

## 2021-09-20 RX ORDER — INSULIN LISPRO 100/ML
VIAL (ML) SUBCUTANEOUS
Refills: 0 | Status: DISCONTINUED | OUTPATIENT
Start: 2021-09-20 | End: 2021-09-22

## 2021-09-20 RX ORDER — INFLUENZA VIRUS VACCINE 15; 15; 15; 15 UG/.5ML; UG/.5ML; UG/.5ML; UG/.5ML
0.5 SUSPENSION INTRAMUSCULAR ONCE
Refills: 0 | Status: DISCONTINUED | OUTPATIENT
Start: 2021-09-20 | End: 2021-09-22

## 2021-09-20 RX ORDER — INSULIN GLARGINE 100 [IU]/ML
40 INJECTION, SOLUTION SUBCUTANEOUS AT BEDTIME
Refills: 0 | Status: DISCONTINUED | OUTPATIENT
Start: 2021-09-20 | End: 2021-09-21

## 2021-09-20 RX ORDER — SODIUM CHLORIDE 9 MG/ML
1000 INJECTION, SOLUTION INTRAVENOUS
Refills: 0 | Status: DISCONTINUED | OUTPATIENT
Start: 2021-09-20 | End: 2021-09-22

## 2021-09-20 RX ORDER — GLUCAGON INJECTION, SOLUTION 0.5 MG/.1ML
1 INJECTION, SOLUTION SUBCUTANEOUS ONCE
Refills: 0 | Status: DISCONTINUED | OUTPATIENT
Start: 2021-09-20 | End: 2021-09-22

## 2021-09-20 RX ORDER — ASPIRIN/CALCIUM CARB/MAGNESIUM 324 MG
325 TABLET ORAL ONCE
Refills: 0 | Status: COMPLETED | OUTPATIENT
Start: 2021-09-20 | End: 2021-09-20

## 2021-09-20 RX ORDER — DEXTROSE 50 % IN WATER 50 %
15 SYRINGE (ML) INTRAVENOUS ONCE
Refills: 0 | Status: DISCONTINUED | OUTPATIENT
Start: 2021-09-20 | End: 2021-09-22

## 2021-09-20 RX ORDER — ATORVASTATIN CALCIUM 80 MG/1
80 TABLET, FILM COATED ORAL AT BEDTIME
Refills: 0 | Status: DISCONTINUED | OUTPATIENT
Start: 2021-09-20 | End: 2021-09-22

## 2021-09-20 RX ORDER — BUDESONIDE AND FORMOTEROL FUMARATE DIHYDRATE 160; 4.5 UG/1; UG/1
2 AEROSOL RESPIRATORY (INHALATION)
Refills: 0 | Status: DISCONTINUED | OUTPATIENT
Start: 2021-09-20 | End: 2021-09-22

## 2021-09-20 RX ORDER — AMLODIPINE BESYLATE 2.5 MG/1
1 TABLET ORAL
Qty: 0 | Refills: 0 | DISCHARGE

## 2021-09-20 RX ORDER — DEXTROSE 50 % IN WATER 50 %
25 SYRINGE (ML) INTRAVENOUS ONCE
Refills: 0 | Status: DISCONTINUED | OUTPATIENT
Start: 2021-09-20 | End: 2021-09-22

## 2021-09-20 RX ORDER — HEPARIN SODIUM 5000 [USP'U]/ML
5000 INJECTION INTRAVENOUS; SUBCUTANEOUS EVERY 8 HOURS
Refills: 0 | Status: DISCONTINUED | OUTPATIENT
Start: 2021-09-20 | End: 2021-09-22

## 2021-09-20 RX ORDER — BUDESONIDE AND FORMOTEROL FUMARATE DIHYDRATE 160; 4.5 UG/1; UG/1
2 AEROSOL RESPIRATORY (INHALATION)
Qty: 0 | Refills: 0 | DISCHARGE

## 2021-09-20 RX ORDER — ALBUTEROL 90 UG/1
2 AEROSOL, METERED ORAL EVERY 6 HOURS
Refills: 0 | Status: DISCONTINUED | OUTPATIENT
Start: 2021-09-20 | End: 2021-09-22

## 2021-09-20 RX ORDER — LABETALOL HCL 100 MG
300 TABLET ORAL
Refills: 0 | Status: DISCONTINUED | OUTPATIENT
Start: 2021-09-20 | End: 2021-09-21

## 2021-09-20 RX ORDER — INSULIN ASPART 100 [IU]/ML
0 INJECTION, SOLUTION SUBCUTANEOUS
Qty: 0 | Refills: 0 | DISCHARGE

## 2021-09-20 RX ORDER — SODIUM CHLORIDE 9 MG/ML
500 INJECTION INTRAMUSCULAR; INTRAVENOUS; SUBCUTANEOUS
Refills: 0 | Status: DISCONTINUED | OUTPATIENT
Start: 2021-09-20 | End: 2021-09-21

## 2021-09-20 RX ORDER — INSULIN GLARGINE 100 [IU]/ML
0 INJECTION, SOLUTION SUBCUTANEOUS
Qty: 0 | Refills: 0 | DISCHARGE

## 2021-09-20 RX ORDER — DEXTROSE 50 % IN WATER 50 %
12.5 SYRINGE (ML) INTRAVENOUS ONCE
Refills: 0 | Status: DISCONTINUED | OUTPATIENT
Start: 2021-09-20 | End: 2021-09-22

## 2021-09-20 RX ORDER — LIDOCAINE 4 G/100G
0 CREAM TOPICAL
Qty: 0 | Refills: 0 | DISCHARGE

## 2021-09-20 RX ADMIN — Medication 300 MILLIGRAM(S): at 22:06

## 2021-09-20 RX ADMIN — Medication 325 MILLIGRAM(S): at 19:37

## 2021-09-20 RX ADMIN — ATORVASTATIN CALCIUM 80 MILLIGRAM(S): 80 TABLET, FILM COATED ORAL at 22:06

## 2021-09-20 RX ADMIN — Medication 6 UNIT(S): at 22:04

## 2021-09-20 RX ADMIN — SODIUM CHLORIDE 50 MILLILITER(S): 9 INJECTION INTRAMUSCULAR; INTRAVENOUS; SUBCUTANEOUS at 22:06

## 2021-09-20 RX ADMIN — HEPARIN SODIUM 5000 UNIT(S): 5000 INJECTION INTRAVENOUS; SUBCUTANEOUS at 22:06

## 2021-09-20 RX ADMIN — Medication 12: at 22:03

## 2021-09-20 NOTE — H&P ADULT - ASSESSMENT
62 y/o M Current smoker; Fhx of CAD, with PMH of HTN, HLD, DM, COPD, hep C s/p Harvoni tx, JUANA (non-compliant with CPAP), depression  Who   is now admitted to cardiology service for further management of chest pain.

## 2021-09-20 NOTE — ED PROVIDER NOTE - NSICDXFAMILYHX_GEN_ALL_CORE_FT
FAMILY HISTORY:  Mother  Still living? Unknown  FH: diabetes mellitus, Age at diagnosis: Age Unknown    Uncle  Still living? Unknown  FH: CAD (coronary artery disease), Age at diagnosis: Age Unknown

## 2021-09-20 NOTE — ED ADULT TRIAGE NOTE - CHIEF COMPLAINT QUOTE
[place] c[place] train [place] with L sided rib pain worse on inspiration, felt sweaty and weak. as per EMS, hypotensive on scene, given 600cc normal saline IVF by EMS PTA. /71 in ED. fs 174 on scene, repeat in progress. hx DM, asthma.

## 2021-09-20 NOTE — H&P ADULT - HISTORY OF PRESENT ILLNESS
62 y/o M Current smoker; Fhx of CAD, with PMH of HTN, HLD, DM, COPD, hep C s/p Harvoni tx, JUANA (non-compliant with CPAP), depression  Who was BIBEMS to [facility] ED 9/20/21 with CC of near syncopal event in the train today. Pt. reports today he was in the train with his wife when he started to sweat profusely all of a sudden. He then got of the train and climbed 1 flight of subway stairs when he started to feel very weak, dizzy, SOB (felt like he was breathing from straw), diaphoretic/nauseous and felt like his chest getting crushed. His wife saw 2 police officers who called 911 for him. As per ER s/o received from EMS, pt hypotensive on scene and received  cc. Currently; pt. comfortable with no sx. He reports that one week ago; when he was trying to catch his bus; he had crushing chest tightness X 1 episode. Pt. denies any chest pain, SOB, dizziness, palpitation, N/V, LE edema, PND/orthopnea, syncope, fever, chills, recent travel and sick contact.  In ED, /71 HR 72 RR 18 T 97.9 02 98 RA, Labs revealed Trop T neg X 1, COVID negative, Cr. 1.70, CXR clear, EKG: NSR @ 67 bpm; TWI lead 2 and 3 (new TWI compared to EKG 03/2020).  In ED, Pt. received  mg PO X 1 and is now admitted to cardiology service for further management of chest pain.      62 y/o M Current smoker; Fhx of CAD, with PMH of HTN, HLD, DM, COPD, hep C s/p Harvoni tx, JUANA (non-compliant with CPAP), depression  Who was BIBEMS to [facility] ED 9/20/21 with CC of near syncopal event in the train today. Pt. reports today he was in the train with his wife when he started to sweat profusely all of a sudden. He then got of the train and climbed 1 flight of subway stairs when he started to feel very weak, dizzy, SOB (felt like he was breathing from straw), diaphoretic/nauseous and felt like his chest getting crushed and felt like he was abt to pass out. His wife saw 2 police officers who called 911 for him. As per ER s/o received from EMS, pt hypotensive on scene and received  cc. Currently; pt. comfortable with no sx. He reports that one week ago; when he was trying to catch his bus; he had crushing chest tightness X 1 episode. Pt. denies any chest pain, SOB, dizziness, palpitation, N/V, LE edema, PND/orthopnea, syncope, fever, chills, recent travel and sick contact.  In ED, /71 HR 72 RR 18 T 97.9 02 98 RA, Labs revealed Trop T neg X 1, COVID negative, Cr. 1.70, CXR clear, EKG: NSR @ 67 bpm; TWI lead 2 and 3 (new TWI compared to EKG 03/2020).  In ED, Pt. received  mg PO X 1 and is now admitted to cardiology service for further management of chest pain.

## 2021-09-20 NOTE — ED ADULT NURSE NOTE - OBJECTIVE STATEMENT
61y male c/o near syncope. pt states, "I was on the train and all of sudden became dizzy, short of breath, sweaty, and had pain by my left ribs. I felt like I was going to pass out so my wife had me sit down." denies LOC. hx of HTN, DM. pt reports that most of his symptoms have since resolved. states he still feels some pressure on L chest near ribs. Respirations even and unlabored. speaking in clear, full sentences. Patient is awake and alert. Alert and oriented x 4. 18G in L and R hand placed by EMS. fluid bolus given by EMS. EKG completed. VS stable as per flow sheet. pt placed on [facility].

## 2021-09-20 NOTE — H&P ADULT - NSHPLABSRESULTS_GEN_ALL_CORE
13.4   10.30 )-----------( 237      ( 20 Sep 2021 17:56 )             38.9   09-20    139  |  105  |  24<H>  ----------------------------<  156<H>  4.5   |  22  |  1.70<H>    Ca    9.7      20 Sep 2021 17:56

## 2021-09-20 NOTE — H&P ADULT - PROBLEM SELECTOR PLAN 3
- c/w home Labetolol 300 mg BID  - c/w home Norvasc 1- mg daily  - holding home losartan 100 mg daily in the setting of elevated Cr.

## 2021-09-20 NOTE — ED PROVIDER NOTE - CLINICAL SUMMARY MEDICAL DECISION MAKING FREE TEXT BOX
Pt c/o near syncope, L sided cp now resolved.  Pt w mult risks for cad and hpi v concerning for acs.  Plan labs, cxr, monitor; likely admit. Pt c/o near syncope, L sided cp now resolved.  Pt w mult risks for cad and hpi v concerning for acs w report of abnl stress 2 yr ago w/o fu at that time.  Plan labs, cxr, monitor; likely admit.

## 2021-09-20 NOTE — ED PROVIDER NOTE - CROS ED CONS ALL NEG
Here with right lower leg redness and pain. Scraped it with a chair yesterday and symptoms started to worsen today.  Currently on antibiotic treatment  For left ear infection and sinusitis
negative...

## 2021-09-20 NOTE — PATIENT PROFILE ADULT - NSPROPTRIGHTBILLOFRIGHTS_GEN_A_NUR
9/30/2020    To whom it may concern:    I have been in frequent contact with Sameera Pollard since 9/23/2020 regarding positive test results within the family and to review isolation and quarantine procedures. The At[facility] family has been following the recommended CDC and [facility] guidelines for isolation and quarantine.    To summarize, father tested positive on 9/17/2020 after 1 day of minimal cold symptoms. Mother and the four children were asymptomatic, but tested on 9/21 due to close contact with a positive case.  Mother and one sibling were positive for COVID-19 and have remained asymptomatic. The remainder of the children had negative tests and have remained asymptomatic.     Father stayed at an outside location for 10 days following his test on 9/18. Mother and child with COVID-19 have isolated themselves within the home, and they have not had close contact with the other three children.  Family members with COVID have not been in the same room as the others, have stayed >6 feet apart with masks if brief incidental contact, not sharing utensils, and separate bathrooms. Additionally, there have been no individuals with coughing or sneezing.     Given the Atul family attention to detail and procedures, I strongly recommend that the three children who have remained asymptomatic and in quarantine may be allowed to return to school on 10/5/2020 (14 days after last close contact with positive household member).    Our understanding of COVID-19 transmission is still developing, and there are always rare occurrences that defy the odds. The CDC guidelines for isolation and quarantine have been drafted to encompass the majority of risk of transmission following close contact.  The average time from exposure to develop symptoms is 5-7 days. (1)    Please contact me if you have further questions.     Sincerely,        Madiha Rodriguez M.D.  [facility]  Phone [phone]    (1) Mare SA, Tamar KH, Ramirez Q,  Vickey FK, Enoch Q, Marian HR, Irena AS, Norma NG, Kyra J. The Incubation Period of Coronavirus Disease 2019 (COVID-19) From Publicly Reported Confirmed Cases: Estimation and Application. [place] [employer] [facility]. 2020 May 5;172(9):577-582. doi: 10.7326/Y92-1510. Epub 2020 Mar 10. PMID: 99873991; PMCID: [id number].     patient

## 2021-09-20 NOTE — H&P ADULT - PROBLEM SELECTOR PLAN 1
- currently chest pain free and with no SOB  - Trop T neg X 1; f/u 10 pm Trop T and am Trop;   - EKG: NSR with TWI lead 2,3 (new compared to 03/2020); f/u am EKG  - ECHO ordered. f/u   - NPO after MN for ischemic eval in am; discuss with DR. Disla

## 2021-09-20 NOTE — H&P ADULT - PROBLEM SELECTOR PLAN 7
- c/w albuterol PRN  - c/w BUdoneside-for BID    DVT PPX: Hep SubQ  Dispo: NPO after MN for ischemic eval in am.

## 2021-09-20 NOTE — H&P ADULT - PROBLEM SELECTOR PLAN 5
- SSI  - holding home oral meds  - Home med: Lantus 60 units bedtime; Humalog 10 units TID  - c/w Lantus 40 units at bedtime and humalog 6 units TID  - f/u am HgbA1c level

## 2021-09-20 NOTE — H&P ADULT - NSHPSOCIALHISTORY_GEN_ALL_CORE
current smoker; smokes 1/2 PPD X 5-6 years; prior to that smoked 1 PPD X muultiple years  denies any ETOH or illicit drug use

## 2021-09-20 NOTE — ED PROVIDER NOTE - PHYSICAL EXAMINATION
VITAL SIGNS: I have reviewed nursing notes and confirm.  CONSTITUTIONAL: Well-developed; well-nourished; in no acute distress.   SKIN:  warm and dry, no acute rash.   HEAD:  normocephalic, atraumatic.  EYES: PERRL, EOM intact; conjunctiva and sclera clear.  ENT:  airway clear.   NECK: Supple; non tender.  CARD: S1, S2 normal; no murmurs, gallops, or rubs. Regular rate and rhythm.   RESP:  Clear to auscultation b/l, no wheezes, rales or rhonchi.  ABD: Normal bowel sounds; soft; non-distended; non-tender; no guardng/ rebound.  EXT: Normal ROM. No clubbing, cyanosis or edema.    NEURO: Alert, oriented, grossly unremarkable  PSYCH: Cooperative, mood and affect appropriate.

## 2021-09-20 NOTE — H&P ADULT - PROBLEM SELECTOR PLAN 2
- Cr 1.71 on admission; on review of My chart Cr 1 05/2021; Cr normal in sunrise 2020  - pt ordered for NS 50 cc/hr X 10 hrs  - f/u am Cr  - f/u UA and urine lytes  - avoid nephrotoxic agents  - holding home losartan

## 2021-09-20 NOTE — ED PROVIDER NOTE - OBJECTIVE STATEMENT
60 yo male h/o dm, hld, htn, tob, copd, chronic back pain c/o near syncope.  Pt [place] and began to feel lh/out of it.  Pt sat on train and had onset of diaphoresis, squeezing bilat ribs, L sided cp, palpitations, sob, and as if he might pass out.  Pt exited the train after ~ 7 stops and still felt he might pass out so called 911.  Pt now feeling better.  Pt reports + exertional cp/sob and also reports failing a stress test ~ 2 y ago but states he did not have fu w cardiology after abnl stress.  No h/o syncope.  No ha, le pain/swelling, recent travel.

## 2021-09-20 NOTE — ED PROVIDER NOTE - NSICDXPASTMEDICALHX_GEN_ALL_CORE_FT
PAST MEDICAL HISTORY:  Chronic obstructive pulmonary disease COPD (chronic obstructive pulmonary disease)    Depressive disorder Depressive disorder    Essential hypertension HTN (hypertension)    Hepatitis C virus infection Hepatitis C    Impotence of organic origin ED (erectile dysfunction)    Obstructive sleep apnea syndrome Obstructive sleep apnea    Type 2 diabetes mellitus DM (diabetes mellitus)     PAST MEDICAL HISTORY:  Chronic obstructive pulmonary disease COPD (chronic obstructive pulmonary disease)    Depressive disorder Depressive disorder    Essential hypertension HTN (hypertension)    Hepatitis C virus infection Hepatitis C    HLD (hyperlipidemia)     HTN (hypertension)     Impotence of organic origin ED (erectile dysfunction)    Obstructive sleep apnea syndrome Obstructive sleep apnea    Type 2 diabetes mellitus DM (diabetes mellitus)

## 2021-09-21 ENCOUNTER — TRANSCRIPTION ENCOUNTER (OUTPATIENT)
Age: 61
End: 2021-09-21

## 2021-09-21 LAB
A1C WITH ESTIMATED AVERAGE GLUCOSE RESULT: 12.8 % — HIGH (ref 4–5.6)
ANION GAP SERPL CALC-SCNC: 13 MMOL/L — SIGNIFICANT CHANGE UP (ref 5–17)
APPEARANCE UR: CLEAR — SIGNIFICANT CHANGE UP
APTT BLD: 28.7 SEC — SIGNIFICANT CHANGE UP (ref 27.5–35.5)
BACTERIA # UR AUTO: PRESENT /HPF
BILIRUB UR-MCNC: NEGATIVE — SIGNIFICANT CHANGE UP
BUN SERPL-MCNC: 28 MG/DL — HIGH (ref 7–23)
CALCIUM SERPL-MCNC: 9.3 MG/DL — SIGNIFICANT CHANGE UP (ref 8.4–10.5)
CHLORIDE SERPL-SCNC: 101 MMOL/L — SIGNIFICANT CHANGE UP (ref 96–108)
CHOLEST SERPL-MCNC: 327 MG/DL — HIGH
CO2 SERPL-SCNC: 23 MMOL/L — SIGNIFICANT CHANGE UP (ref 22–31)
COLOR SPEC: YELLOW — SIGNIFICANT CHANGE UP
COVID-19 SPIKE DOMAIN AB INTERP: POSITIVE
COVID-19 SPIKE DOMAIN ANTIBODY RESULT: 127 U/ML — HIGH
CREAT SERPL-MCNC: 1.71 MG/DL — HIGH (ref 0.5–1.3)
DIFF PNL FLD: ABNORMAL
EPI CELLS # UR: SIGNIFICANT CHANGE UP /HPF (ref 0–5)
ESTIMATED AVERAGE GLUCOSE: 321 MG/DL — HIGH (ref 68–114)
GLUCOSE BLDC GLUCOMTR-MCNC: 167 MG/DL — HIGH (ref 70–99)
GLUCOSE BLDC GLUCOMTR-MCNC: 200 MG/DL — HIGH (ref 70–99)
GLUCOSE BLDC GLUCOMTR-MCNC: 205 MG/DL — HIGH (ref 70–99)
GLUCOSE BLDC GLUCOMTR-MCNC: 225 MG/DL — HIGH (ref 70–99)
GLUCOSE BLDC GLUCOMTR-MCNC: 232 MG/DL — HIGH (ref 70–99)
GLUCOSE BLDC GLUCOMTR-MCNC: 283 MG/DL — HIGH (ref 70–99)
GLUCOSE BLDC GLUCOMTR-MCNC: 458 MG/DL — CRITICAL HIGH (ref 70–99)
GLUCOSE BLDC GLUCOMTR-MCNC: 479 MG/DL — CRITICAL HIGH (ref 70–99)
GLUCOSE SERPL-MCNC: 184 MG/DL — HIGH (ref 70–99)
GLUCOSE UR QL: >=1000
HCT VFR BLD CALC: 38.2 % — LOW (ref 39–50)
HDLC SERPL-MCNC: 39 MG/DL — LOW
HGB BLD-MCNC: 13 G/DL — SIGNIFICANT CHANGE UP (ref 13–17)
HYALINE CASTS # UR AUTO: ABNORMAL /LPF (ref 0–2)
INR BLD: 0.88 — SIGNIFICANT CHANGE UP (ref 0.88–1.16)
KETONES UR-MCNC: NEGATIVE — SIGNIFICANT CHANGE UP
LEUKOCYTE ESTERASE UR-ACNC: NEGATIVE — SIGNIFICANT CHANGE UP
LIPID PNL WITH DIRECT LDL SERPL: 196 MG/DL — HIGH
MAGNESIUM SERPL-MCNC: 2.2 MG/DL — SIGNIFICANT CHANGE UP (ref 1.6–2.6)
MCHC RBC-ENTMCNC: 29 PG — SIGNIFICANT CHANGE UP (ref 27–34)
MCHC RBC-ENTMCNC: 34 GM/DL — SIGNIFICANT CHANGE UP (ref 32–36)
MCV RBC AUTO: 85.1 FL — SIGNIFICANT CHANGE UP (ref 80–100)
NITRITE UR-MCNC: NEGATIVE — SIGNIFICANT CHANGE UP
NON HDL CHOLESTEROL: 288 MG/DL — HIGH
NRBC # BLD: 0 /100 WBCS — SIGNIFICANT CHANGE UP (ref 0–0)
PH UR: 5.5 — SIGNIFICANT CHANGE UP (ref 5–8)
PLATELET # BLD AUTO: 239 K/UL — SIGNIFICANT CHANGE UP (ref 150–400)
POTASSIUM SERPL-MCNC: 3.9 MMOL/L — SIGNIFICANT CHANGE UP (ref 3.5–5.3)
POTASSIUM SERPL-SCNC: 3.9 MMOL/L — SIGNIFICANT CHANGE UP (ref 3.5–5.3)
PROT ?TM UR-MCNC: 269.9 MG/DL — SIGNIFICANT CHANGE UP (ref 0–12)
PROT ?TM UR-MCNC: 269.9 MG/DL — SIGNIFICANT CHANGE UP (ref 0–12)
PROT UR-MCNC: 100 MG/DL
PROT/CREAT UR-RTO: 1.7 RATIO — HIGH (ref 0–0.2)
PROTHROM AB SERPL-ACNC: 10.6 SEC — SIGNIFICANT CHANGE UP (ref 10.6–13.6)
RBC # BLD: 4.49 M/UL — SIGNIFICANT CHANGE UP (ref 4.2–5.8)
RBC # FLD: 12.1 % — SIGNIFICANT CHANGE UP (ref 10.3–14.5)
RBC CASTS # UR COMP ASSIST: < 5 /HPF — SIGNIFICANT CHANGE UP
SARS-COV-2 IGG+IGM SERPL QL IA: 127 U/ML — HIGH
SARS-COV-2 IGG+IGM SERPL QL IA: POSITIVE
SODIUM SERPL-SCNC: 137 MMOL/L — SIGNIFICANT CHANGE UP (ref 135–145)
SP GR SPEC: >=1.03 — SIGNIFICANT CHANGE UP (ref 1–1.03)
TRIGL SERPL-MCNC: 458 MG/DL — HIGH
TROPONIN T SERPL-MCNC: 0.01 NG/ML — SIGNIFICANT CHANGE UP (ref 0–0.01)
TSH SERPL-MCNC: 1.66 UIU/ML — SIGNIFICANT CHANGE UP (ref 0.27–4.2)
UROBILINOGEN FLD QL: 0.2 E.U./DL — SIGNIFICANT CHANGE UP
WBC # BLD: 8.34 K/UL — SIGNIFICANT CHANGE UP (ref 3.8–10.5)
WBC # FLD AUTO: 8.34 K/UL — SIGNIFICANT CHANGE UP (ref 3.8–10.5)
WBC UR QL: < 5 /HPF — SIGNIFICANT CHANGE UP

## 2021-09-21 PROCEDURE — 99222 1ST HOSP IP/OBS MODERATE 55: CPT | Mod: GC

## 2021-09-21 PROCEDURE — 93454 CORONARY ARTERY ANGIO S&I: CPT | Mod: 26,59

## 2021-09-21 PROCEDURE — 99233 SBSQ HOSP IP/OBS HIGH 50: CPT | Mod: 25

## 2021-09-21 PROCEDURE — 93572 IV DOP VEL&/PRESS C FLO EA: CPT | Mod: 26,52,LC

## 2021-09-21 PROCEDURE — 93306 TTE W/DOPPLER COMPLETE: CPT | Mod: 26

## 2021-09-21 PROCEDURE — 99152 MOD SED SAME PHYS/QHP 5/>YRS: CPT

## 2021-09-21 PROCEDURE — 93571 IV DOP VEL&/PRESS C FLO 1ST: CPT | Mod: 26,52,LD

## 2021-09-21 PROCEDURE — 92928 PRQ TCAT PLMT NTRAC ST 1 LES: CPT | Mod: LD

## 2021-09-21 RX ORDER — CLOPIDOGREL BISULFATE 75 MG/1
75 TABLET, FILM COATED ORAL DAILY
Refills: 0 | Status: DISCONTINUED | OUTPATIENT
Start: 2021-09-22 | End: 2021-09-22

## 2021-09-21 RX ORDER — INSULIN LISPRO 100/ML
10 VIAL (ML) SUBCUTANEOUS
Refills: 0 | Status: DISCONTINUED | OUTPATIENT
Start: 2021-09-21 | End: 2021-09-22

## 2021-09-21 RX ORDER — CARVEDILOL PHOSPHATE 80 MG/1
6.25 CAPSULE, EXTENDED RELEASE ORAL EVERY 12 HOURS
Refills: 0 | Status: DISCONTINUED | OUTPATIENT
Start: 2021-09-21 | End: 2021-09-22

## 2021-09-21 RX ORDER — CLOPIDOGREL BISULFATE 75 MG/1
600 TABLET, FILM COATED ORAL ONCE
Refills: 0 | Status: COMPLETED | OUTPATIENT
Start: 2021-09-21 | End: 2021-09-21

## 2021-09-21 RX ORDER — SODIUM CHLORIDE 9 MG/ML
500 INJECTION INTRAMUSCULAR; INTRAVENOUS; SUBCUTANEOUS
Refills: 0 | Status: DISCONTINUED | OUTPATIENT
Start: 2021-09-21 | End: 2021-09-22

## 2021-09-21 RX ORDER — SODIUM CHLORIDE 9 MG/ML
500 INJECTION INTRAMUSCULAR; INTRAVENOUS; SUBCUTANEOUS
Refills: 0 | Status: DISCONTINUED | OUTPATIENT
Start: 2021-09-21 | End: 2021-09-21

## 2021-09-21 RX ORDER — INSULIN GLARGINE 100 [IU]/ML
50 INJECTION, SOLUTION SUBCUTANEOUS AT BEDTIME
Refills: 0 | Status: DISCONTINUED | OUTPATIENT
Start: 2021-09-21 | End: 2021-09-22

## 2021-09-21 RX ORDER — POTASSIUM CHLORIDE 20 MEQ
20 PACKET (EA) ORAL ONCE
Refills: 0 | Status: COMPLETED | OUTPATIENT
Start: 2021-09-21 | End: 2021-09-21

## 2021-09-21 RX ADMIN — INSULIN GLARGINE 50 UNIT(S): 100 INJECTION, SOLUTION SUBCUTANEOUS at 22:03

## 2021-09-21 RX ADMIN — INSULIN GLARGINE 40 UNIT(S): 100 INJECTION, SOLUTION SUBCUTANEOUS at 00:23

## 2021-09-21 RX ADMIN — Medication 100 MILLIGRAM(S): at 00:23

## 2021-09-21 RX ADMIN — Medication 2: at 16:22

## 2021-09-21 RX ADMIN — BUDESONIDE AND FORMOTEROL FUMARATE DIHYDRATE 2 PUFF(S): 160; 4.5 AEROSOL RESPIRATORY (INHALATION) at 09:47

## 2021-09-21 RX ADMIN — Medication 100 MILLIGRAM(S): at 16:53

## 2021-09-21 RX ADMIN — BUDESONIDE AND FORMOTEROL FUMARATE DIHYDRATE 2 PUFF(S): 160; 4.5 AEROSOL RESPIRATORY (INHALATION) at 22:03

## 2021-09-21 RX ADMIN — CARVEDILOL PHOSPHATE 6.25 MILLIGRAM(S): 80 CAPSULE, EXTENDED RELEASE ORAL at 18:14

## 2021-09-21 RX ADMIN — Medication 100 MILLIGRAM(S): at 05:49

## 2021-09-21 RX ADMIN — AMLODIPINE BESYLATE 10 MILLIGRAM(S): 2.5 TABLET ORAL at 05:49

## 2021-09-21 RX ADMIN — Medication 4: at 12:57

## 2021-09-21 RX ADMIN — Medication 100 MILLIGRAM(S): at 22:02

## 2021-09-21 RX ADMIN — HEPARIN SODIUM 5000 UNIT(S): 5000 INJECTION INTRAVENOUS; SUBCUTANEOUS at 05:49

## 2021-09-21 RX ADMIN — HEPARIN SODIUM 5000 UNIT(S): 5000 INJECTION INTRAVENOUS; SUBCUTANEOUS at 22:02

## 2021-09-21 RX ADMIN — BUDESONIDE AND FORMOTEROL FUMARATE DIHYDRATE 2 PUFF(S): 160; 4.5 AEROSOL RESPIRATORY (INHALATION) at 00:23

## 2021-09-21 RX ADMIN — Medication 4: at 22:02

## 2021-09-21 RX ADMIN — ATORVASTATIN CALCIUM 80 MILLIGRAM(S): 80 TABLET, FILM COATED ORAL at 22:02

## 2021-09-21 RX ADMIN — Medication 2: at 06:37

## 2021-09-21 RX ADMIN — Medication 300 MILLIGRAM(S): at 05:49

## 2021-09-21 RX ADMIN — Medication 10 UNIT(S): at 18:14

## 2021-09-21 RX ADMIN — Medication 300 MILLIGRAM(S): at 17:35

## 2021-09-21 RX ADMIN — Medication 81 MILLIGRAM(S): at 12:57

## 2021-09-21 RX ADMIN — CLOPIDOGREL BISULFATE 600 MILLIGRAM(S): 75 TABLET, FILM COATED ORAL at 12:57

## 2021-09-21 RX ADMIN — Medication 20 MILLIEQUIVALENT(S): at 09:47

## 2021-09-21 NOTE — DISCHARGE NOTE PROVIDER - HOSPITAL COURSE
60 y/o M Current smoker; Fhx of CAD, with PMH of HTN, HLD, DM, COPD, hep C s/p Harvoni tx, JUANA (non-compliant with CPAP), depression, presents c/o near syncope and chest pain episode. Reports 2 episodes of exertional crushing chest tightness, a/w SOB, dizziness, weakness prior to ED and 1 week ago. Trop negative x 3. EKG w/ new TWi inferolaterally. Admitted to cardiac tele for r/o ACS. ECHO 9/21/21: EF 50-55%, mild concentric LVH. Pt underwent cardiac cath given risk factors: current smoker, uncontrolled DM (A1c 12.8), uncontrolled HLD. S/p cardiac catheterization 9/21/21 w/ Dr Azar: PTCA/ANNELIESE mLAD (IFR +), EDP 12. R radial site CDI no hematoma/oozing. Endocrine was consulted for uncontrolled DM, A1c 12.8. Preventive cardiology consulted.     Pt was seen and examined at bedside, denies any complaints of chest pain, dizziness, SOB, palpitations, pain, LE edema, fever and/or chills. Right radial/groin access site soft, no bleeding or swelling at site, radial pulse 2+, DP/PT pulses at baseline. No events on tele overnight, VSS, Labs unremarkable/stable, Physical exam WNL. Pt was seen and examined by cardiology attending as well and is deemed stable for discharge per Dr. Disla. Pt is to continue ASA/Plavix, Atorvastatin. Pt is to follow up with cardiologist Dr. Sandoval in 1-2 weeks for post discharge check-up. Pt instructed to return to ED/seek immediate medical attention if symptoms of chest pain, SOB, LOC, bleeding from the access site. Pt agrees with the discharge plan, verbalizes understanding of the information given. All medications explained risks/side effects and e-prescribed to patient preferred pharmacy. 60 y/o M Current smoker; Fhx of CAD, with PMH of HTN, HLD, DM, COPD, hep C s/p Harvoni tx, JUANA (non-compliant with CPAP), depression, presents c/o near syncope and chest pain episode. Reports 2 episodes of exertional crushing chest tightness, a/w SOB, dizziness, weakness prior to ED and 1 week ago. Trop negative x 3. EKG w/ new TWi inferolaterally. Admitted to cardiac tele for r/o ACS. ECHO 9/21/21: EF 50-55%, mild concentric LVH. Pt underwent cardiac cath given risk factors: current smoker, uncontrolled DM (A1c 12.8), uncontrolled HLD. S/p cardiac catheterization 9/21/21 w/ Dr Azar: PTCA/ANNELIESE mLAD (IFR +), EDP 12. R radial site CDI no hematoma/oozing. Endocrine was consulted for uncontrolled DM, A1c 12.8. Preventive cardiology consulted inhospital. Pt reports was not taking statin as outpatient as he was confused with his meds, but now agreeable to take daily given stent.     Pt was seen and examined at bedside, denies any complaints of chest pain, dizziness, SOB, palpitations, pain, LE edema, fever and/or chills. Right radial access site soft, no bleeding or swelling at site, radial pulse 2+, DP/PT pulses at baseline. No events on tele overnight, VSS, Labs unremarkable/stable, Physical exam WNL. Pt was seen and examined by cardiology attending as well and is deemed stable for discharge per Dr. Disla. Pt is to continue ASA/Plavix, Atorvastatin 80mg qd at discharge. Pt is to follow up with outpatient cardiologist Dr. Sandoval in 1-2 weeks for post discharge check-up. Pt instructed to return to ED/seek immediate medical attention if symptoms of chest pain, SOB, LOC, bleeding from the access site. Pt agrees with the discharge plan, verbalizes understanding of the information given. All medications explained risks/side effects and e-prescribed to patient preferred pharmacy. Referred for Cardiac Rehab (CAD Post PCI): Education on benefits of Cardiac Rehab provided to patient. Referral and Prescription Given for Cardiac Rehab. Pt given list of locations & instructed to contact their insurance company to review list of participating providers. Pt instructed to bring Cardiac Rehab prescription with them to Cardiology Follow up appointment for assistance with enrollment. 62 y/o M Current smoker; Fhx of CAD, with PMH of HTN, HLD, DM, COPD, hep C s/p Harvoni tx, JUANA (non-compliant with CPAP), depression, presents c/o near syncope and chest pain episode. Reports 2 episodes of exertional crushing chest tightness, a/w SOB, dizziness, weakness prior to ED and 1 week ago. Trop negative x 3. EKG w/ new TWi inferolaterally. Admitted to cardiac tele for r/o ACS. ECHO 9/21/21: EF 50-55%, mild concentric LVH. Pt underwent cardiac cath given risk factors: current smoker, uncontrolled DM (A1c 12.8), uncontrolled HLD. S/p cardiac catheterization 9/21/21 w/ Dr Azar: PTCA/ANNELIESE mLAD (IFR +), EDP 12. R radial site CDI no hematoma/oozing. Endocrine was consulted for uncontrolled DM, A1c 12.8. At discharge to c/w Metformin 1000mg BID; insulin regimen changed to Lantus 50units qHS and Lispro 15units TID before meals. Preventive cardiology consulted inhospital. Pt reports was not taking statin as outpatient as he was confused with his meds, but now agreeable to take daily given stent.     Pt was seen and examined at bedside, denies any complaints of chest pain, dizziness, SOB, palpitations, pain, LE edema, fever and/or chills. Right radial access site soft, no bleeding or swelling at site, radial pulse 2+, DP/PT pulses at baseline. No events on tele overnight, VSS, Labs unremarkable/stable, Physical exam WNL. Pt was seen and examined by cardiology attending as well and is deemed stable for discharge per Dr. Disla. Pt is to continue ASA/Plavix, Atorvastatin 80mg qd at discharge. Pt is to follow up with outpatient cardiologist Dr. Sandoval in 1-2 weeks for post discharge check-up. Pt instructed to return to ED/seek immediate medical attention if symptoms of chest pain, SOB, LOC, bleeding from the access site. Pt agrees with the discharge plan, verbalizes understanding of the information given. All medications explained risks/side effects and e-prescribed to patient preferred pharmacy. Referred for Cardiac Rehab (CAD Post PCI): Education on benefits of Cardiac Rehab provided to patient. Referral and Prescription Given for Cardiac Rehab. Pt given list of locations & instructed to contact their insurance company to review list of participating providers. Pt instructed to bring Cardiac Rehab prescription with them to Cardiology Follow up appointment for assistance with enrollment. 60 y/o M Current smoker; Fhx of CAD, with PMH of HTN, HLD, DM, COPD, hep C s/p Harvoni tx, JUANA (non-compliant with CPAP), depression, presents c/o near syncope and chest pain episode. Reports 2 episodes of exertional crushing chest tightness, a/w SOB, dizziness, weakness prior to ED and 1 week ago. Trop negative x 3. EKG w/ new TWi inferolaterally. Admitted to cardiac tele for r/o ACS. ECHO 9/21/21: EF 50-55%, mild concentric LVH. Pt underwent cardiac cath given risk factors: current smoker, uncontrolled DM (A1c 12.8), uncontrolled HLD. S/p cardiac catheterization 9/21/21 w/ Dr Azar: s/p PTCA/ANNELIESE mLAD 70-80% (IFR +); dRCA 50% (neg IFR), OM 50% (neg IFR), EDP 12. R radial site CDI no hematoma/oozing. Endocrine was consulted for uncontrolled DM, A1c 12.8. At discharge to c/w Metformin 1000mg BID; insulin regimen changed to Lantus 50units qHS and Lispro 15units TID before meals. Preventive cardiology consulted inhospital. Pt reports was not taking statin as outpatient as he was confused with his meds, but now agreeable to take daily given stent.     Pt was seen and examined at bedside, denies any complaints of chest pain, dizziness, SOB, palpitations, pain, LE edema, fever and/or chills. Right radial access site soft, no bleeding or swelling at site, radial pulse 2+, DP/PT pulses at baseline. No events on tele overnight, VSS, Labs unremarkable/stable, Physical exam WNL. Pt was seen and examined by cardiology attending as well and is deemed stable for discharge per Dr. Disla. Pt is to continue ASA/Plavix, Atorvastatin 80mg qd at discharge. Pt is to follow up with outpatient cardiologist Dr. Sandoval in 1-2 weeks for post discharge check-up. Pt instructed to return to ED/seek immediate medical attention if symptoms of chest pain, SOB, LOC, bleeding from the access site. Pt agrees with the discharge plan, verbalizes understanding of the information given. All medications explained risks/side effects and e-prescribed to patient preferred pharmacy. Referred for Cardiac Rehab (CAD Post PCI): Education on benefits of Cardiac Rehab provided to patient. Referral and Prescription Given for Cardiac Rehab. Pt given list of locations & instructed to contact their insurance company to review list of participating providers. Pt instructed to bring Cardiac Rehab prescription with them to Cardiology Follow up appointment for assistance with enrollment.

## 2021-09-21 NOTE — PROGRESS NOTE ADULT - ASSESSMENT
60 y/o M Current smoker; Fhx of CAD, with PMH of HTN, HLD, DM, COPD, hep C s/p Harvoni tx, JUANA (non-compliant with CPAP), depression  Who   is now admitted to cardiology service for further management of chest pain.  62 y/o M Current smoker; Fhx of CAD, with PMH of HTN, HLD, DM, COPD, hep C s/p Harvoni tx, JUANA (non-compliant with CPAP), depression, presents c/o near syncope and chest pain episode. Trop negative x 3. EKG w/ new TWi inferolaterally. Admitted to cardiac tele for r/o ACS. Pending cardiac cath today w/ Dr Azar. Vika Daly

## 2021-09-21 NOTE — PROGRESS NOTE ADULT - SUBJECTIVE AND OBJECTIVE BOX
CARDIOLOGY NP PROGRESS NOTE    Subjective: Pt seen and examined at bedside, reports feeling better this AM.  Remainder ROS otherwise negative.    Overnight Events: NPO s/p MN for cath    TELEMETRY:    EKG:      VITAL SIGNS:  T(C): 36.5 (09-21-21 @ 10:08), Max: 36.8 (09-20-21 @ 19:50)  HR: 74 (09-21-21 @ 12:13) (63 - 84)  BP: 142/73 (09-21-21 @ 12:13) (110/61 - 160/85)  RR: 18 (09-21-21 @ 12:13) (16 - 18)  SpO2: 97% (09-21-21 @ 12:13) (93% - 99%)  Wt(kg): --    I&O's Summary    20 Sep 2021 07:01  -  21 Sep 2021 07:00  --------------------------------------------------------  IN: 0 mL / OUT: 200 mL / NET: -200 mL    21 Sep 2021 07:01  -  21 Sep 2021 12:45  --------------------------------------------------------  IN: 230 mL / OUT: 200 mL / NET: 30 mL          PHYSICAL EXAM:    General: A/ox 3, No acute Distress  Neck: Supple, NO JVD  Cardiac: S1 S2, No M/R/G  Pulmonary: CTAB, Breathing unlabored, No Rhonchi/Rales/Wheezing  Abdomen: Soft, Non -tender, +BS x 4 quads  Extremities: No Rashes, No edema  Neuro: A/o x 3, No focal deficits          LABS:                          13.0   8.34  )-----------( 239      ( 21 Sep 2021 06:50 )             38.2                              09-21    137  |  101  |  28<H>  ----------------------------<  184<H>  3.9   |  23  |  1.71<H>    Ca    9.3      21 Sep 2021 06:50  Mg     2.2     09-21                              PT/INR - ( 21 Sep 2021 06:50 )   PT: 10.6 sec;   INR: 0.88          PTT - ( 21 Sep 2021 06:50 )  PTT:28.7 sec  CAPILLARY BLOOD GLUCOSE      POCT Blood Glucose.: 232 mg/dL (21 Sep 2021 12:35)  POCT Blood Glucose.: 200 mg/dL (21 Sep 2021 06:33)  POCT Blood Glucose.: 283 mg/dL (21 Sep 2021 02:58)  POCT Blood Glucose.: 458 mg/dL (21 Sep 2021 00:11)  POCT Blood Glucose.: 479 mg/dL (21 Sep 2021 00:07)  POCT Blood Glucose.: 474 mg/dL (20 Sep 2021 21:49)  POCT Blood Glucose.: 163 mg/dL (20 Sep 2021 16:04)    CARDIAC MARKERS ( 21 Sep 2021 06:50 )  x     / 0.01 ng/mL / x     / x     / x      CARDIAC MARKERS ( 20 Sep 2021 23:03 )  x     / 0.01 ng/mL / x     / x     / x      CARDIAC MARKERS ( 20 Sep 2021 17:56 )  x     / 0.01 ng/mL / x     / x     / x              Allergies:  No Known Allergies  Originally Entered as [Other - Mild] reaction to [Other][cantalope [ throat itchy and irritated]] (Other)    MEDICATIONS  (STANDING):  amLODIPine   Tablet 10 milliGRAM(s) Oral daily  aspirin enteric coated 81 milliGRAM(s) Oral daily  atorvastatin 80 milliGRAM(s) Oral at bedtime  budesonide  80 MICROgram(s)/formoterol 4.5 MICROgram(s) Inhaler 2 Puff(s) Inhalation two times a day  dextrose 40% Gel 15 Gram(s) Oral once  dextrose 5%. 1000 milliLiter(s) (50 mL/Hr) IV Continuous <Continuous>  dextrose 5%. 1000 milliLiter(s) (100 mL/Hr) IV Continuous <Continuous>  dextrose 50% Injectable 25 Gram(s) IV Push once  dextrose 50% Injectable 12.5 Gram(s) IV Push once  dextrose 50% Injectable 25 Gram(s) IV Push once  glucagon  Injectable 1 milliGRAM(s) IntraMuscular once  heparin   Injectable 5000 Unit(s) SubCutaneous every 8 hours  influenza   Vaccine 0.5 milliLiter(s) IntraMuscular once  insulin glargine Injectable (LANTUS) 40 Unit(s) SubCutaneous at bedtime  insulin lispro (ADMELOG) corrective regimen sliding scale   SubCutaneous Before meals and at bedtime  insulin lispro Injectable (ADMELOG) 6 Unit(s) SubCutaneous three times a day before meals  labetalol 300 milliGRAM(s) Oral two times a day  pregabalin 100 milliGRAM(s) Oral three times a day  sodium chloride 0.9%. 500 milliLiter(s) (50 mL/Hr) IV Continuous <Continuous>    MEDICATIONS  (PRN):  ALBUTerol    90 MICROgram(s) HFA Inhaler 2 Puff(s) Inhalation every 6 hours PRN Shortness of Breath and/or Wheezing        DIAGNOSTIC TESTS:        CARDIOLOGY NP PROGRESS NOTE    Subjective: Pt seen and examined at bedside, reports feeling better this AM.  Remainder ROS otherwise negative.    Overnight Events: NPO s/p MN for cath    TELEMETRY: SR 60s    EKG: NSR w/ TWi inferolaterally (new compared to 2020 EKG)      VITAL SIGNS:  T(C): 36.5 (09-21-21 @ 10:08), Max: 36.8 (09-20-21 @ 19:50)  HR: 74 (09-21-21 @ 12:13) (63 - 84)  BP: 142/73 (09-21-21 @ 12:13) (110/61 - 160/85)  RR: 18 (09-21-21 @ 12:13) (16 - 18)  SpO2: 97% (09-21-21 @ 12:13) (93% - 99%)  Wt(kg): --    I&O's Summary    20 Sep 2021 07:01  -  21 Sep 2021 07:00  --------------------------------------------------------  IN: 0 mL / OUT: 200 mL / NET: -200 mL    21 Sep 2021 07:01  -  21 Sep 2021 12:45  --------------------------------------------------------  IN: 230 mL / OUT: 200 mL / NET: 30 mL          PHYSICAL EXAM:    General: A/ox 3, No acute Distress  Neck: Supple, NO JVD  Cardiac: S1 S2, No M/R/G  Pulmonary: CTAB, Breathing unlabored on RA, No Rhonchi/Rales/Wheezing  Abdomen: Soft, Non -tender, +BS x 4 quads  Extremities: No Rashes, No edema  Vascular: 2+ radial pulses deanne, 2+ DP/PT/popliteal pulse deanne. No femoral bruit deanne  Neuro: A/o x 3, No focal deficits          LABS:                          13.0   8.34  )-----------( 239      ( 21 Sep 2021 06:50 )             38.2                              09-21    137  |  101  |  28<H>  ----------------------------<  184<H>  3.9   |  23  |  1.71<H>    Ca    9.3      21 Sep 2021 06:50  Mg     2.2     09-21      PT/INR - ( 21 Sep 2021 06:50 )   PT: 10.6 sec;   INR: 0.88          PTT - ( 21 Sep 2021 06:50 )  PTT:28.7 sec  CAPILLARY BLOOD GLUCOSE      POCT Blood Glucose.: 232 mg/dL (21 Sep 2021 12:35)  POCT Blood Glucose.: 200 mg/dL (21 Sep 2021 06:33)  POCT Blood Glucose.: 283 mg/dL (21 Sep 2021 02:58)  POCT Blood Glucose.: 458 mg/dL (21 Sep 2021 00:11)  POCT Blood Glucose.: 479 mg/dL (21 Sep 2021 00:07)  POCT Blood Glucose.: 474 mg/dL (20 Sep 2021 21:49)  POCT Blood Glucose.: 163 mg/dL (20 Sep 2021 16:04)    CARDIAC MARKERS ( 21 Sep 2021 06:50 )  x     / 0.01 ng/mL / x     / x     / x      CARDIAC MARKERS ( 20 Sep 2021 23:03 )  x     / 0.01 ng/mL / x     / x     / x      CARDIAC MARKERS ( 20 Sep 2021 17:56 )  x     / 0.01 ng/mL / x     / x     / x              Allergies:  No Known Allergies  Originally Entered as [Other - Mild] reaction to [Other][cantalope [ throat itchy and irritated]] (Other)    MEDICATIONS  (STANDING):  amLODIPine   Tablet 10 milliGRAM(s) Oral daily  aspirin enteric coated 81 milliGRAM(s) Oral daily  atorvastatin 80 milliGRAM(s) Oral at bedtime  budesonide  80 MICROgram(s)/formoterol 4.5 MICROgram(s) Inhaler 2 Puff(s) Inhalation two times a day  dextrose 40% Gel 15 Gram(s) Oral once  dextrose 5%. 1000 milliLiter(s) (50 mL/Hr) IV Continuous <Continuous>  dextrose 5%. 1000 milliLiter(s) (100 mL/Hr) IV Continuous <Continuous>  dextrose 50% Injectable 25 Gram(s) IV Push once  dextrose 50% Injectable 12.5 Gram(s) IV Push once  dextrose 50% Injectable 25 Gram(s) IV Push once  glucagon  Injectable 1 milliGRAM(s) IntraMuscular once  heparin   Injectable 5000 Unit(s) SubCutaneous every 8 hours  influenza   Vaccine 0.5 milliLiter(s) IntraMuscular once  insulin glargine Injectable (LANTUS) 40 Unit(s) SubCutaneous at bedtime  insulin lispro (ADMELOG) corrective regimen sliding scale   SubCutaneous Before meals and at bedtime  insulin lispro Injectable (ADMELOG) 6 Unit(s) SubCutaneous three times a day before meals  labetalol 300 milliGRAM(s) Oral two times a day  pregabalin 100 milliGRAM(s) Oral three times a day  sodium chloride 0.9%. 500 milliLiter(s) (50 mL/Hr) IV Continuous <Continuous>    MEDICATIONS  (PRN):  ALBUTerol    90 MICROgram(s) HFA Inhaler 2 Puff(s) Inhalation every 6 hours PRN Shortness of Breath and/or Wheezing        DIAGNOSTIC TESTS:     < from: TTE Echo Complete w/o Contrast w/ Doppler (09.21.21 @ 11:04) >  CONCLUSIONS:   1. No significant valvular disease.   2. The right ventricle is normal in size. Right ventricular systolic function is normal.   3. There is mild concentric left ventricular hypertrophy. The left ventricle is normal in size and systolic function with a calculated ejection fraction of 50-55%.   4. No pericardial effusion.   5. No prior echo is available for comparison.    < end of copied text >     CARDIOLOGY NP PROGRESS NOTE    Subjective: Pt seen and examined at bedside, reports feeling better this AM. Reports feeling crushing chest pain a/w diaphoresis, dizziness, sob on exertion yesterday. Denies lightheadedness, palpitations, n/v. Remainder ROS otherwise negative.    Overnight Events: NPO s/p MN for cath    TELEMETRY: SR 60s    EKG: NSR w/ TWi inferolaterally (new compared to 2020 EKG)      VITAL SIGNS:  T(C): 36.5 (09-21-21 @ 10:08), Max: 36.8 (09-20-21 @ 19:50)  HR: 74 (09-21-21 @ 12:13) (63 - 84)  BP: 142/73 (09-21-21 @ 12:13) (110/61 - 160/85)  RR: 18 (09-21-21 @ 12:13) (16 - 18)  SpO2: 97% (09-21-21 @ 12:13) (93% - 99%)  Wt(kg): --    I&O's Summary    20 Sep 2021 07:01  -  21 Sep 2021 07:00  --------------------------------------------------------  IN: 0 mL / OUT: 200 mL / NET: -200 mL    21 Sep 2021 07:01  -  21 Sep 2021 12:45  --------------------------------------------------------  IN: 230 mL / OUT: 200 mL / NET: 30 mL          PHYSICAL EXAM:    General: A/ox 3, No acute Distress  Neck: Supple, NO JVD  Cardiac: S1 S2, No M/R/G  Pulmonary: CTAB, Breathing unlabored on RA, No Rhonchi/Rales/Wheezing  Abdomen: Soft, Non -tender, +BS x 4 quads  Extremities: No Rashes, No edema  Vascular: 2+ radial pulses deanne, 2+ DP/PT/popliteal pulse deanne. No femoral bruit deanne  Neuro: A/o x 3, No focal deficits          LABS:                          13.0   8.34  )-----------( 239      ( 21 Sep 2021 06:50 )             38.2                              09-21    137  |  101  |  28<H>  ----------------------------<  184<H>  3.9   |  23  |  1.71<H>    Ca    9.3      21 Sep 2021 06:50  Mg     2.2     09-21      PT/INR - ( 21 Sep 2021 06:50 )   PT: 10.6 sec;   INR: 0.88          PTT - ( 21 Sep 2021 06:50 )  PTT:28.7 sec  CAPILLARY BLOOD GLUCOSE      POCT Blood Glucose.: 232 mg/dL (21 Sep 2021 12:35)  POCT Blood Glucose.: 200 mg/dL (21 Sep 2021 06:33)  POCT Blood Glucose.: 283 mg/dL (21 Sep 2021 02:58)  POCT Blood Glucose.: 458 mg/dL (21 Sep 2021 00:11)  POCT Blood Glucose.: 479 mg/dL (21 Sep 2021 00:07)  POCT Blood Glucose.: 474 mg/dL (20 Sep 2021 21:49)  POCT Blood Glucose.: 163 mg/dL (20 Sep 2021 16:04)    CARDIAC MARKERS ( 21 Sep 2021 06:50 )  x     / 0.01 ng/mL / x     / x     / x      CARDIAC MARKERS ( 20 Sep 2021 23:03 )  x     / 0.01 ng/mL / x     / x     / x      CARDIAC MARKERS ( 20 Sep 2021 17:56 )  x     / 0.01 ng/mL / x     / x     / x              Allergies:  No Known Allergies  Originally Entered as [Other - Mild] reaction to [Other][cantalope [ throat itchy and irritated]] (Other)    MEDICATIONS  (STANDING):  amLODIPine   Tablet 10 milliGRAM(s) Oral daily  aspirin enteric coated 81 milliGRAM(s) Oral daily  atorvastatin 80 milliGRAM(s) Oral at bedtime  budesonide  80 MICROgram(s)/formoterol 4.5 MICROgram(s) Inhaler 2 Puff(s) Inhalation two times a day  dextrose 40% Gel 15 Gram(s) Oral once  dextrose 5%. 1000 milliLiter(s) (50 mL/Hr) IV Continuous <Continuous>  dextrose 5%. 1000 milliLiter(s) (100 mL/Hr) IV Continuous <Continuous>  dextrose 50% Injectable 25 Gram(s) IV Push once  dextrose 50% Injectable 12.5 Gram(s) IV Push once  dextrose 50% Injectable 25 Gram(s) IV Push once  glucagon  Injectable 1 milliGRAM(s) IntraMuscular once  heparin   Injectable 5000 Unit(s) SubCutaneous every 8 hours  influenza   Vaccine 0.5 milliLiter(s) IntraMuscular once  insulin glargine Injectable (LANTUS) 40 Unit(s) SubCutaneous at bedtime  insulin lispro (ADMELOG) corrective regimen sliding scale   SubCutaneous Before meals and at bedtime  insulin lispro Injectable (ADMELOG) 6 Unit(s) SubCutaneous three times a day before meals  labetalol 300 milliGRAM(s) Oral two times a day  pregabalin 100 milliGRAM(s) Oral three times a day  sodium chloride 0.9%. 500 milliLiter(s) (50 mL/Hr) IV Continuous <Continuous>    MEDICATIONS  (PRN):  ALBUTerol    90 MICROgram(s) HFA Inhaler 2 Puff(s) Inhalation every 6 hours PRN Shortness of Breath and/or Wheezing        DIAGNOSTIC TESTS:     < from: TTE Echo Complete w/o Contrast w/ Doppler (09.21.21 @ 11:04) >  CONCLUSIONS:   1. No significant valvular disease.   2. The right ventricle is normal in size. Right ventricular systolic function is normal.   3. There is mild concentric left ventricular hypertrophy. The left ventricle is normal in size and systolic function with a calculated ejection fraction of 50-55%.   4. No pericardial effusion.   5. No prior echo is available for comparison.    < end of copied text >

## 2021-09-21 NOTE — PROGRESS NOTE ADULT - PROBLEM SELECTOR PLAN 5
- SSI  - holding home oral meds  - Home med: Lantus 60 units bedtime; Humalog 10 units TID  - c/w Lantus 40 units at bedtime and humalog 6 units TID  - f/u am HgbA1c level - HgbA1c 12.8. FSS 400s on admission, now FS 200s  - Holding home oral meds inhospital  - SSI  - Home med: Lantus 60 units bedtime; Humalog 10 units TID, Metformin 1000mg BID  - c/w Lantus 40 units at bedtime and Humalog 6 units TID  - Endocrine consulted for uncontrolled DM

## 2021-09-21 NOTE — CONSULT NOTE ADULT - SUBJECTIVE AND OBJECTIVE BOX
HPI: 60 y/o M Current smoker; Fhx of CAD, with PMH of HTN, HLD, DM, COPD, hep C s/p Harvoni tx, JUANA (non-compliant with CPAP), depression  Who was BIBEMS to [facility] ED 9/20/21 with CC of near syncopal event in the train today. Pt. reports today he was in the train with his wife when he started to sweat profusely all of a sudden. He then got of the train and climbed 1 flight of subway stairs when he started to feel very weak, dizzy, SOB (felt like he was breathing from straw), diaphoretic/nauseous and felt like his chest getting crushed and felt like he was abt to pass out. His wife saw 2 police officers who called 911 for him. As per ER s/o received from EMS, pt hypotensive on scene and received  cc. Currently; pt. comfortable with no sx. He reports that one week ago; when he was trying to catch his bus; he had crushing chest tightness X 1 episode. Pt. denies any chest pain, SOB, dizziness, palpitation, N/V, LE edema, PND/orthopnea, syncope, fever, chills, recent travel and sick contact.  In ED, /71 HR 72 RR 18 T 97.9 02 98 RA, Labs revealed Trop T neg X 1, COVID negative, Cr. 1.70, CXR clear, EKG: NSR @ 67 bpm; TWI lead 2 and 3 (new TWI compared to EKG 03/2020).  In ED, Pt. received  mg PO X 1 and is now admitted to cardiology service for further management of chest pain.         Age at Dx:  How dx:  Hx and duration of insulin:  Current Therapy:  Hx of hypoglycemia  Hx of DKA/HHS?    Home FSG:  Fasting  Lunch  Dinner  Bed    Hx of other regimens  Complications:  Outpatient Endo:    PMH & Surgical Hx:NEAR SYNCOPE; CHEST PAIN    FH: CAD (coronary artery disease) (Uncle)    FH: diabetes mellitus (Mother)    Handoff    MEWS Score    Depressive disorder    Obstructive sleep apnea syndrome    Hepatitis C virus infection    Type 2 diabetes mellitus    Chronic obstructive pulmonary disease    Essential hypertension    Impotence of organic origin    Near syncope    ACS (acute coronary syndrome)    HTN (hypertension)    HLD (hyperlipidemia)    DM (diabetes mellitus)    Depression    History of COPD    MESSI (acute kidney injury)    Other postprocedural status    Other postprocedural status    Other postprocedural status    NEAR SYNCOPE    90+    Chest pain    SysAdmin_VisitLink        FH:  DM:  Thyroid:  Autoimmune:  Other:    SH:  Smoking  Etoh:  Recreational Drugs:  Social Life:    Current Meds:  ALBUTerol    90 MICROgram(s) HFA Inhaler 2 Puff(s) Inhalation every 6 hours PRN  amLODIPine   Tablet 10 milliGRAM(s) Oral daily  aspirin enteric coated 81 milliGRAM(s) Oral daily  atorvastatin 80 milliGRAM(s) Oral at bedtime  budesonide  80 MICROgram(s)/formoterol 4.5 MICROgram(s) Inhaler 2 Puff(s) Inhalation two times a day  dextrose 40% Gel 15 Gram(s) Oral once  dextrose 5%. 1000 milliLiter(s) IV Continuous <Continuous>  dextrose 5%. 1000 milliLiter(s) IV Continuous <Continuous>  dextrose 50% Injectable 25 Gram(s) IV Push once  dextrose 50% Injectable 12.5 Gram(s) IV Push once  dextrose 50% Injectable 25 Gram(s) IV Push once  glucagon  Injectable 1 milliGRAM(s) IntraMuscular once  heparin   Injectable 5000 Unit(s) SubCutaneous every 8 hours  influenza   Vaccine 0.5 milliLiter(s) IntraMuscular once  insulin glargine Injectable (LANTUS) 40 Unit(s) SubCutaneous at bedtime  insulin lispro (ADMELOG) corrective regimen sliding scale   SubCutaneous Before meals and at bedtime  insulin lispro Injectable (ADMELOG) 6 Unit(s) SubCutaneous three times a day before meals  labetalol 300 milliGRAM(s) Oral two times a day  pregabalin 100 milliGRAM(s) Oral three times a day  sodium chloride 0.9%. 500 milliLiter(s) IV Continuous <Continuous>      Allergies:  No Known Allergies  Originally Entered as [Other - Mild] reaction to [Other][cantalope [ throat itchy and irritated]] (Other)      ROS:  Denies the following except as indicated.    General: weight loss/weight gain, decreased appetite, fatigue  Eyes: Blurry vision, double vision, visual changes  ENT: Throat pain, changes in voice,   CV: palpitations, SOB, CP, cough  GI: NVD, difficulty swallowing, abdominal pain  : polyuria, dysuria  Endo: abnormal menses, temperature intolerance, decreased libido  MSK: weakness, joint pain  Skin: rash, dryness, diaphoresis  Heme: Easy bruising,bleeding  Neuro: HA, dizziness, lightheadedness, numbness tingling  Psych: Anxiety, Depression    Vital Signs Last 24 Hrs  T(C): 36.5 (21 Sep 2021 10:08), Max: 36.8 (20 Sep 2021 19:50)  T(F): 97.7 (21 Sep 2021 10:08), Max: 98.2 (20 Sep 2021 19:50)  HR: 69 (21 Sep 2021 08:35) (63 - 84)  BP: 119/67 (21 Sep 2021 08:35) (110/61 - 160/85)  BP(mean): --  RR: 16 (21 Sep 2021 08:35) (16 - 18)  SpO2: 98% (21 Sep 2021 08:35) (93% - 99%)  Height (cm): 175.3 (09-20 @ 15:56)  Weight (kg): 86.2 (09-20 @ 15:56)  BMI (kg/m2): 28.1 (09-20 @ 15:56)      Constitutional: wn/wd in NAD.   HEENT: NCAT, MMM, OP clear, EOMI, , no proptosis or lid retraction  Neck: no thyromegaly or palpable thyroid nodules   Respiratory: lungs CTAB.  Cardiovascular: regular rhythm, normal S1 and S2, no audible murmurs, no peripheral edema  GI: soft, NT/ND, no masses/HSM appreciated.  Neurology: no tremors, DTR 2+  Skin: no visible rashes/lesions  Psychiatric: AAO x 3, normal affect/mood.  Ext: radial pulses intact, DP pulses intact, extremities warm, no cyanosis, clubbing or edema.       LABS:                        13.0   8.34  )-----------( 239      ( 21 Sep 2021 06:50 )             38.2     09-21    137  |  101  |  28<H>  ----------------------------<  184<H>  3.9   |  23  |  1.71<H>    Ca    9.3      21 Sep 2021 06:50  Mg     2.2     09-21      PT/INR - ( 21 Sep 2021 06:50 )   PT: 10.6 sec;   INR: 0.88          PTT - ( 21 Sep 2021 06:50 )  PTT:28.7 sec  Urinalysis Basic - ( 21 Sep 2021 03:59 )    Color: Yellow / Appearance: Clear / SG: >=1.030 / pH: x  Gluc: x / Ketone: NEGATIVE  / Bili: Negative / Urobili: 0.2 E.U./dL   Blood: x / Protein: 100 mg/dL / Nitrite: NEGATIVE   Leuk Esterase: NEGATIVE / RBC: < 5 /HPF / WBC < 5 /HPF   Sq Epi: x / Non Sq Epi: 0-5 /HPF / Bacteria: Present /HPF            RADIOLOGY & ADDITIONAL STUDIES:  CAPILLARY BLOOD GLUCOSE      POCT Blood Glucose.: 200 mg/dL (21 Sep 2021 06:33)  POCT Blood Glucose.: 283 mg/dL (21 Sep 2021 02:58)  POCT Blood Glucose.: 458 mg/dL (21 Sep 2021 00:11)  POCT Blood Glucose.: 479 mg/dL (21 Sep 2021 00:07)  POCT Blood Glucose.: 474 mg/dL (20 Sep 2021 21:49)  POCT Blood Glucose.: 163 mg/dL (20 Sep 2021 16:04)        A/P:61y Male    1.  DM  Please continue lantus       units at night / morning.  Please continue lispro      units before each meal.  Please continue lispro moderate / low dose sliding scale four times daily with meals and at bedtime    Pt's fingerstick glucose goal is     Will continue to monitor     For discharge, pt can continue    Pt can follow up at discharge with [facility] by calling  to make an appointment.   Will discuss case with     and update primary team HPI: 60 y/o M Current smoker; Fhx of CAD, with PMH of HTN, HLD, DM, COPD, hep C s/p Harvoni tx, JUANA (non-compliant with CPAP), depression  Who was BIBEMS to [facility] ED 9/20/21 with CC of near syncopal event in the train today. Pt. reports today he was in the train with his wife when he started to sweat profusely all of a sudden. He then got of the train and climbed 1 flight of subway stairs when he started to feel very weak, dizzy, SOB (felt like he was breathing from straw), diaphoretic/nauseous and felt like his chest getting crushed and felt like he was abt to pass out. His wife saw 2 police officers who called 911 for him. As per ER s/o received from EMS, pt hypotensive on scene and received  cc. Currently; pt. comfortable with no sx. He reports that one week ago; when he was trying to catch his bus; he had crushing chest tightness X 1 episode. Pt. denies any chest pain, SOB, dizziness, palpitation, N/V, LE edema, PND/orthopnea, syncope, fever, chills, recent travel and sick contact.  In ED, /71 HR 72 RR 18 T 97.9 02 98 RA, Labs revealed Trop T neg X 1, COVID negative, Cr. 1.70, CXR clear, EKG: NSR @ 67 bpm; TWI lead 2 and 3 (new TWI compared to EKG 03/2020).  In ED, Pt. received  mg PO X 1 and is now admitted to cardiology service for further management of chest pain.         Age at Dx: 45 yrs  How dx: Episode of Diaphrosis was diagnosed at [facility].  Hx and duration of insulin: 10 years  Current Therapy: Metformin 1000mg BID, Humalog 10 U Premeal.  No Hx of hypoglycemia  No Hx of DKA/HHS?    Home FSG: Can range from 150 to 250    Hx of other regimens  Complications: c/o Upper and lower extremity numbness and tingling. Pt is on Lyrica.  Outpatient Endo: [facility], but he is noon adherent. Medications are send by PCP.    PMH & Surgical Hx:NEAR SYNCOPE; CHEST PAIN    FH: CAD (coronary artery disease) (Uncle)    FH: diabetes mellitus (Mother)    Handoff    MEWS Score    Depressive disorder    Obstructive sleep apnea syndrome    Hepatitis C virus infection    Type 2 diabetes mellitus    Chronic obstructive pulmonary disease    Essential hypertension    Impotence of organic origin    Near syncope    ACS (acute coronary syndrome)    HTN (hypertension)    HLD (hyperlipidemia)    DM (diabetes mellitus)    Depression    History of COPD    MESSI (acute kidney injury)    Other postprocedural status    Other postprocedural status    Other postprocedural status    NEAR SYNCOPE    90+    Chest pain    SysAdmin_VisitLink        FH:  DM:  Thyroid:  Autoimmune:  Other:    SH:  Smoking  Etoh:  Recreational Drugs:  Social Life:    Current Meds:  ALBUTerol    90 MICROgram(s) HFA Inhaler 2 Puff(s) Inhalation every 6 hours PRN  amLODIPine   Tablet 10 milliGRAM(s) Oral daily  aspirin enteric coated 81 milliGRAM(s) Oral daily  atorvastatin 80 milliGRAM(s) Oral at bedtime  budesonide  80 MICROgram(s)/formoterol 4.5 MICROgram(s) Inhaler 2 Puff(s) Inhalation two times a day  dextrose 40% Gel 15 Gram(s) Oral once  dextrose 5%. 1000 milliLiter(s) IV Continuous <Continuous>  dextrose 5%. 1000 milliLiter(s) IV Continuous <Continuous>  dextrose 50% Injectable 25 Gram(s) IV Push once  dextrose 50% Injectable 12.5 Gram(s) IV Push once  dextrose 50% Injectable 25 Gram(s) IV Push once  glucagon  Injectable 1 milliGRAM(s) IntraMuscular once  heparin   Injectable 5000 Unit(s) SubCutaneous every 8 hours  influenza   Vaccine 0.5 milliLiter(s) IntraMuscular once  insulin glargine Injectable (LANTUS) 40 Unit(s) SubCutaneous at bedtime  insulin lispro (ADMELOG) corrective regimen sliding scale   SubCutaneous Before meals and at bedtime  insulin lispro Injectable (ADMELOG) 6 Unit(s) SubCutaneous three times a day before meals  labetalol 300 milliGRAM(s) Oral two times a day  pregabalin 100 milliGRAM(s) Oral three times a day  sodium chloride 0.9%. 500 milliLiter(s) IV Continuous <Continuous>      Allergies:  No Known Allergies  Originally Entered as [Other - Mild] reaction to [Other][cantalope [ throat itchy and irritated]] (Other)      ROS:  Denies the following except as indicated.    General: weight loss/weight gain, decreased appetite, fatigue  Eyes: Blurry vision, double vision, visual changes  ENT: Throat pain, changes in voice,   CV: palpitations, SOB, CP, cough  GI: NVD, difficulty swallowing, abdominal pain  : polyuria, dysuria  Endo: abnormal menses, temperature intolerance, decreased libido  MSK: weakness, joint pain  Skin: rash, dryness, diaphoresis  Heme: Easy bruising,bleeding  Neuro: HA, dizziness, lightheadedness, numbness tingling  Psych: Anxiety, Depression    Vital Signs Last 24 Hrs  T(C): 36.5 (21 Sep 2021 10:08), Max: 36.8 (20 Sep 2021 19:50)  T(F): 97.7 (21 Sep 2021 10:08), Max: 98.2 (20 Sep 2021 19:50)  HR: 69 (21 Sep 2021 08:35) (63 - 84)  BP: 119/67 (21 Sep 2021 08:35) (110/61 - 160/85)  BP(mean): --  RR: 16 (21 Sep 2021 08:35) (16 - 18)  SpO2: 98% (21 Sep 2021 08:35) (93% - 99%)  Height (cm): 175.3 (09-20 @ 15:56)  Weight (kg): 86.2 (09-20 @ 15:56)  BMI (kg/m2): 28.1 (09-20 @ 15:56)      Constitutional: wn/wd in NAD.   HEENT: NCAT, MMM, OP clear, EOMI, , no proptosis or lid retraction  Neck: no thyromegaly or palpable thyroid nodules   Respiratory: lungs CTAB.  Cardiovascular: regular rhythm, normal S1 and S2, no audible murmurs, no peripheral edema  GI: soft, NT/ND, no masses/HSM appreciated.  Neurology: no tremors, DTR 2+  Skin: no visible rashes/lesions  Psychiatric: AAO x 3, normal affect/mood.  Ext: radial pulses intact, DP pulses intact, extremities warm, no cyanosis, clubbing or edema.       LABS:                        13.0   8.34  )-----------( 239      ( 21 Sep 2021 06:50 )             38.2     09-21    137  |  101  |  28<H>  ----------------------------<  184<H>  3.9   |  23  |  1.71<H>    Ca    9.3      21 Sep 2021 06:50  Mg     2.2     09-21      PT/INR - ( 21 Sep 2021 06:50 )   PT: 10.6 sec;   INR: 0.88          PTT - ( 21 Sep 2021 06:50 )  PTT:28.7 sec  Urinalysis Basic - ( 21 Sep 2021 03:59 )    Color: Yellow / Appearance: Clear / SG: >=1.030 / pH: x  Gluc: x / Ketone: NEGATIVE  / Bili: Negative / Urobili: 0.2 E.U./dL   Blood: x / Protein: 100 mg/dL / Nitrite: NEGATIVE   Leuk Esterase: NEGATIVE / RBC: < 5 /HPF / WBC < 5 /HPF   Sq Epi: x / Non Sq Epi: 0-5 /HPF / Bacteria: Present /HPF            RADIOLOGY & ADDITIONAL STUDIES:  CAPILLARY BLOOD GLUCOSE      POCT Blood Glucose.: 200 mg/dL (21 Sep 2021 06:33)  POCT Blood Glucose.: 283 mg/dL (21 Sep 2021 02:58)  POCT Blood Glucose.: 458 mg/dL (21 Sep 2021 00:11)  POCT Blood Glucose.: 479 mg/dL (21 Sep 2021 00:07)  POCT Blood Glucose.: 474 mg/dL (20 Sep 2021 21:49)  POCT Blood Glucose.: 163 mg/dL (20 Sep 2021 16:04)        A/P:61y Male    1.  DM  Please continue lantus       units at night / morning.  Please continue lispro      units before each meal.  Please continue lispro moderate / low dose sliding scale four times daily with meals and at bedtime    Pt's fingerstick glucose goal is     Will continue to monitor     For discharge, pt can continue    Pt can follow up at discharge with [facility] by calling  to make an appointment.   Will discuss case with     and update primary team HPI: 62 y/o M Current smoker; Fhx of CAD, with PMH of HTN, HLD, DM, COPD, hep C s/p Harvoni tx, JUANA (non-compliant with CPAP), depression  Who was BIBEMS to [facility] ED 9/20/21 with CC of near syncopal event in the train today. Pt. reports today he was in the train with his wife when he started to sweat profusely all of a sudden. He then got of the train and climbed 1 flight of subway stairs when he started to feel very weak, dizzy, SOB (felt like he was breathing from straw), diaphoretic/nauseous and felt like his chest getting crushed and felt like he was abt to pass out. His wife saw 2 police officers who called 911 for him. As per ER s/o received from EMS, pt hypotensive on scene and received  cc. Currently; pt. comfortable with no sx. He reports that one week ago; when he was trying to catch his bus; he had crushing chest tightness X 1 episode. Pt. denies any chest pain, SOB, dizziness, palpitation, N/V, LE edema, PND/orthopnea, syncope, fever, chills, recent travel and sick contact.  In ED, /71 HR 72 RR 18 T 97.9 02 98 RA, Labs revealed Trop T neg X 1, COVID negative, Cr. 1.70, CXR clear, EKG: NSR @ 67 bpm; TWI lead 2 and 3 (new TWI compared to EKG 03/2020).  In ED, Pt. received  mg PO X 1 and is now admitted to cardiology service for further management of chest pain.         Age at Dx: 45 yrs  How dx: Episode of Diaphrosis was diagnosed at [facility].  Hx and duration of insulin: 10 years  Current Therapy: Metformin 1000mg BID, Humalog 10 U Premeal.  No Hx of hypoglycemia  No Hx of DKA/HHS?    Home FSG: Can range from 150 to 250    Hx of other regimens  Complications: c/o Upper and lower extremity numbness and tingling. Pt is on Lyrica.  Outpatient Endo: [facility] [place], but he is noon adherent. Medications are send by PCP.    PMH & Surgical Hx:NEAR SYNCOPE; CHEST PAIN    FH: CAD (coronary artery disease) (Uncle)    FH: diabetes mellitus (Mother)    Handoff    MEWS Score    Depressive disorder    Obstructive sleep apnea syndrome    Hepatitis C virus infection    Type 2 diabetes mellitus    Chronic obstructive pulmonary disease    Essential hypertension    Impotence of organic origin    Near syncope    ACS (acute coronary syndrome)    HTN (hypertension)    HLD (hyperlipidemia)    DM (diabetes mellitus)    Depression    History of COPD    MESSI (acute kidney injury)    Other postprocedural status    Other postprocedural status    Other postprocedural status    NEAR SYNCOPE    90+    Chest pain    SysAdmin_VisitLink        FH:  DM: Mother  Thyroid: Sister (pt unsure if it is hyper or hypo)  Autoimmune: None  Other:    SH:  Smoking Active smoker since the past 40 Yrs 0.5 PPD  Recreational Drugs: None      Current Meds:  ALBUTerol    90 MICROgram(s) HFA Inhaler 2 Puff(s) Inhalation every 6 hours PRN  amLODIPine   Tablet 10 milliGRAM(s) Oral daily  aspirin enteric coated 81 milliGRAM(s) Oral daily  atorvastatin 80 milliGRAM(s) Oral at bedtime  budesonide  80 MICROgram(s)/formoterol 4.5 MICROgram(s) Inhaler 2 Puff(s) Inhalation two times a day  dextrose 40% Gel 15 Gram(s) Oral once  dextrose 5%. 1000 milliLiter(s) IV Continuous <Continuous>  dextrose 5%. 1000 milliLiter(s) IV Continuous <Continuous>  dextrose 50% Injectable 25 Gram(s) IV Push once  dextrose 50% Injectable 12.5 Gram(s) IV Push once  dextrose 50% Injectable 25 Gram(s) IV Push once  glucagon  Injectable 1 milliGRAM(s) IntraMuscular once  heparin   Injectable 5000 Unit(s) SubCutaneous every 8 hours  influenza   Vaccine 0.5 milliLiter(s) IntraMuscular once  insulin glargine Injectable (LANTUS) 40 Unit(s) SubCutaneous at bedtime  insulin lispro (ADMELOG) corrective regimen sliding scale   SubCutaneous Before meals and at bedtime  insulin lispro Injectable (ADMELOG) 6 Unit(s) SubCutaneous three times a day before meals  labetalol 300 milliGRAM(s) Oral two times a day  pregabalin 100 milliGRAM(s) Oral three times a day  sodium chloride 0.9%. 500 milliLiter(s) IV Continuous <Continuous>      Allergies:  No Known Allergies  Originally Entered as [Other - Mild] reaction to [Other][cantalope [ throat itchy and irritated]] (Other)      ROS:  Denies the following except as indicated.    General: weight loss/weight gain, decreased appetite, fatigue  Eyes: Blurry vision, double vision, visual changes  ENT: Throat pain, changes in voice,   CV: palpitations, SOB, CP, cough  GI: NVD, difficulty swallowing, abdominal pain  : polyuria, dysuria  Endo: abnormal menses, temperature intolerance, decreased libido  MSK: weakness, joint pain  Skin: rash, dryness, diaphoresis  Heme: Easy bruising,bleeding  Neuro: HA, dizziness, lightheadedness, numbness tingling  Psych: Anxiety, Depression    Vital Signs Last 24 Hrs  T(C): 36.5 (21 Sep 2021 10:08), Max: 36.8 (20 Sep 2021 19:50)  T(F): 97.7 (21 Sep 2021 10:08), Max: 98.2 (20 Sep 2021 19:50)  HR: 69 (21 Sep 2021 08:35) (63 - 84)  BP: 119/67 (21 Sep 2021 08:35) (110/61 - 160/85)  BP(mean): --  RR: 16 (21 Sep 2021 08:35) (16 - 18)  SpO2: 98% (21 Sep 2021 08:35) (93% - 99%)  Height (cm): 175.3 (09-20 @ 15:56)  Weight (kg): 86.2 (09-20 @ 15:56)  BMI (kg/m2): 28.1 (09-20 @ 15:56)      Constitutional: wn/wd in NAD.   HEENT: NCAT, MMM, OP clear, EOMI, , no proptosis or lid retraction  Neck: no thyromegaly or palpable thyroid nodules   Respiratory: lungs CTAB.  Cardiovascular: regular rhythm, normal S1 and S2, no audible murmurs, no peripheral edema  GI: soft, NT/ND, no masses/HSM appreciated.  Neurology: no tremors, DTR 2+  Skin: no visible rashes/lesions  Psychiatric: AAO x 3, normal affect/mood.  Ext: radial pulses intact, DP pulses intact, extremities warm, no cyanosis, clubbing or edema.       LABS:                        13.0   8.34  )-----------( 239      ( 21 Sep 2021 06:50 )             38.2     09-21    137  |  101  |  28<H>  ----------------------------<  184<H>  3.9   |  23  |  1.71<H>    Ca    9.3      21 Sep 2021 06:50  Mg     2.2     09-21      PT/INR - ( 21 Sep 2021 06:50 )   PT: 10.6 sec;   INR: 0.88          PTT - ( 21 Sep 2021 06:50 )  PTT:28.7 sec  Urinalysis Basic - ( 21 Sep 2021 03:59 )    Color: Yellow / Appearance: Clear / SG: >=1.030 / pH: x  Gluc: x / Ketone: NEGATIVE  / Bili: Negative / Urobili: 0.2 E.U./dL   Blood: x / Protein: 100 mg/dL / Nitrite: NEGATIVE   Leuk Esterase: NEGATIVE / RBC: < 5 /HPF / WBC < 5 /HPF   Sq Epi: x / Non Sq Epi: 0-5 /HPF / Bacteria: Present /HPF            RADIOLOGY & ADDITIONAL STUDIES:  CAPILLARY BLOOD GLUCOSE      POCT Blood Glucose.: 200 mg/dL (21 Sep 2021 06:33)  POCT Blood Glucose.: 283 mg/dL (21 Sep 2021 02:58)  POCT Blood Glucose.: 458 mg/dL (21 Sep 2021 00:11)  POCT Blood Glucose.: 479 mg/dL (21 Sep 2021 00:07)  POCT Blood Glucose.: 474 mg/dL (20 Sep 2021 21:49)  POCT Blood Glucose.: 163 mg/dL (20 Sep 2021 16:04)        A/P:61y Male    1.  DM  Please continue lantus       units at night / morning.  Please continue lispro      units before each meal.  Please continue lispro moderate / low dose sliding scale four times daily with meals and at bedtime    Pt's fingerstick glucose goal is     Will continue to monitor     For discharge, pt can continue    Pt can follow up at discharge with [facility] by calling  to make an appointment.   Will discuss case with     and update primary team HPI: 60 y/o M Current smoker; Fhx of CAD, with PMH of HTN, HLD, DM, COPD, hep C s/p Harvoni tx, JUANA (non-compliant with CPAP), depression  Who was BIBEMS to [facility] ED 9/20/21 with CC of near syncopal event in the train today. Pt. reports today he was in the train with his wife when he started to sweat profusely all of a sudden. He then got of the train and climbed 1 flight of subway stairs when he started to feel very weak, dizzy, SOB (felt like he was breathing from straw), diaphoretic/nauseous and felt like his chest getting crushed and felt like he was abt to pass out. His wife saw 2 police officers who called 911 for him. As per ER s/o received from EMS, pt hypotensive on scene and received  cc. Currently; pt. comfortable with no sx. He reports that one week ago; when he was trying to catch his bus; he had crushing chest tightness X 1 episode. Pt. denies any chest pain, SOB, dizziness, palpitation, N/V, LE edema, PND/orthopnea, syncope, fever, chills, recent travel and sick contact.  In ED, /71 HR 72 RR 18 T 97.9 02 98 RA, Labs revealed Trop T neg X 1, COVID negative, Cr. 1.70, CXR clear, EKG: NSR @ 67 bpm; TWI lead 2 and 3 (new TWI compared to EKG 03/2020).  In ED, Pt. received  mg PO X 1 and is now admitted to cardiology service for further management of chest pain.         Age at Dx: 45 yrs  How dx: Episode of Diaphrosis was diagnosed at [facility].  Hx and duration of insulin: 10 years  Current Therapy: Metformin 1000mg BID, Humalog 10 U Premeal.  No Hx of hypoglycemia  No Hx of DKA/HHS?    Home FSG: Can range from 150 to 250    Hx of other regimens  Complications: c/o Upper and lower extremity numbness and tingling. Pt is on Lyrica.  Outpatient Endo: [facility] [place], but he is noon adherent. Medications are send by PCP.    PMH & Surgical Hx:NEAR SYNCOPE; CHEST PAIN    FH: CAD (coronary artery disease) (Uncle)    FH: diabetes mellitus (Mother)    Handoff    MEWS Score    Depressive disorder    Obstructive sleep apnea syndrome    Hepatitis C virus infection    Type 2 diabetes mellitus    Chronic obstructive pulmonary disease    Essential hypertension    Impotence of organic origin    Near syncope    ACS (acute coronary syndrome)    HTN (hypertension)    HLD (hyperlipidemia)    DM (diabetes mellitus)    Depression    History of COPD    MESSI (acute kidney injury)    Other postprocedural status    Other postprocedural status    Other postprocedural status    NEAR SYNCOPE    90+    Chest pain    SysAdmin_VisitLink        FH:  DM: Mother  Thyroid: Sister (pt unsure if it is hyper or hypo)  Autoimmune: None  Other:    SH:  Smoking Active smoker since the past 40 Yrs 0.5 PPD  Recreational Drugs: None      Current Meds:  ALBUTerol    90 MICROgram(s) HFA Inhaler 2 Puff(s) Inhalation every 6 hours PRN  amLODIPine   Tablet 10 milliGRAM(s) Oral daily  aspirin enteric coated 81 milliGRAM(s) Oral daily  atorvastatin 80 milliGRAM(s) Oral at bedtime  budesonide  80 MICROgram(s)/formoterol 4.5 MICROgram(s) Inhaler 2 Puff(s) Inhalation two times a day  dextrose 40% Gel 15 Gram(s) Oral once  dextrose 5%. 1000 milliLiter(s) IV Continuous <Continuous>  dextrose 5%. 1000 milliLiter(s) IV Continuous <Continuous>  dextrose 50% Injectable 25 Gram(s) IV Push once  dextrose 50% Injectable 12.5 Gram(s) IV Push once  dextrose 50% Injectable 25 Gram(s) IV Push once  glucagon  Injectable 1 milliGRAM(s) IntraMuscular once  heparin   Injectable 5000 Unit(s) SubCutaneous every 8 hours  influenza   Vaccine 0.5 milliLiter(s) IntraMuscular once  insulin glargine Injectable (LANTUS) 40 Unit(s) SubCutaneous at bedtime  insulin lispro (ADMELOG) corrective regimen sliding scale   SubCutaneous Before meals and at bedtime  insulin lispro Injectable (ADMELOG) 6 Unit(s) SubCutaneous three times a day before meals  labetalol 300 milliGRAM(s) Oral two times a day  pregabalin 100 milliGRAM(s) Oral three times a day  sodium chloride 0.9%. 500 milliLiter(s) IV Continuous <Continuous>      Allergies:  No Known Allergies  Originally Entered as [Other - Mild] reaction to [Other][cantalope [ throat itchy and irritated]] (Other)      ROS:  Denies the following except as indicated.    General: weight loss/weight gain, decreased appetite, fatigue  Eyes: Blurry vision, double vision, visual changes  ENT: Throat pain, changes in voice,   CV: palpitations, SOB, CP, cough  GI: NVD, difficulty swallowing, abdominal pain  : polyuria, dysuria  Endo: abnormal menses, temperature intolerance, decreased libido  MSK: weakness, joint pain  Skin: rash, dryness, diaphoresis  Heme: Easy bruising,bleeding  Neuro: HA, dizziness, lightheadedness, numbness tingling  Psych: Anxiety, Depression    Vital Signs Last 24 Hrs  T(C): 36.5 (21 Sep 2021 10:08), Max: 36.8 (20 Sep 2021 19:50)  T(F): 97.7 (21 Sep 2021 10:08), Max: 98.2 (20 Sep 2021 19:50)  HR: 69 (21 Sep 2021 08:35) (63 - 84)  BP: 119/67 (21 Sep 2021 08:35) (110/61 - 160/85)  BP(mean): --  RR: 16 (21 Sep 2021 08:35) (16 - 18)  SpO2: 98% (21 Sep 2021 08:35) (93% - 99%)  Height (cm): 175.3 (09-20 @ 15:56)  Weight (kg): 86.2 (09-20 @ 15:56)  BMI (kg/m2): 28.1 (09-20 @ 15:56)      Constitutional: wn/wd in NAD.   HEENT: NCAT, MMM, OP clear, EOMI, , no proptosis or lid retraction  Neck: no thyromegaly or palpable thyroid nodules   Respiratory: lungs CTAB.  Cardiovascular: regular rhythm, normal S1 and S2, no audible murmurs, no peripheral edema  GI: soft, NT/ND, no masses/HSM appreciated.  Neurology: no tremors, DTR 2+  Skin: no visible rashes/lesions  Psychiatric: AAO x 3, normal affect/mood.  Ext: radial pulses intact, DP pulses intact, extremities warm, no cyanosis, clubbing or edema.       LABS:                        13.0   8.34  )-----------( 239      ( 21 Sep 2021 06:50 )             38.2     09-21    137  |  101  |  28<H>  ----------------------------<  184<H>  3.9   |  23  |  1.71<H>    Ca    9.3      21 Sep 2021 06:50  Mg     2.2     09-21      PT/INR - ( 21 Sep 2021 06:50 )   PT: 10.6 sec;   INR: 0.88          PTT - ( 21 Sep 2021 06:50 )  PTT:28.7 sec  Urinalysis Basic - ( 21 Sep 2021 03:59 )    Color: Yellow / Appearance: Clear / SG: >=1.030 / pH: x  Gluc: x / Ketone: NEGATIVE  / Bili: Negative / Urobili: 0.2 E.U./dL   Blood: x / Protein: 100 mg/dL / Nitrite: NEGATIVE   Leuk Esterase: NEGATIVE / RBC: < 5 /HPF / WBC < 5 /HPF   Sq Epi: x / Non Sq Epi: 0-5 /HPF / Bacteria: Present /HPF            RADIOLOGY & ADDITIONAL STUDIES:  CAPILLARY BLOOD GLUCOSE      POCT Blood Glucose.: 200 mg/dL (21 Sep 2021 06:33)  POCT Blood Glucose.: 283 mg/dL (21 Sep 2021 02:58)  POCT Blood Glucose.: 458 mg/dL (21 Sep 2021 00:11)  POCT Blood Glucose.: 479 mg/dL (21 Sep 2021 00:07)  POCT Blood Glucose.: 474 mg/dL (20 Sep 2021 21:49)  POCT Blood Glucose.: 163 mg/dL (20 Sep 2021 16:04)        A/P:61y Male    1.  DM  Please continue lantus 50  units at night.  Please continue lispro 10 units before each meal.  Please continue lispro moderate / low dose sliding scale four times daily with meals and at bedtime    Pt's fingerstick glucose goal is 100-180    Will continue to monitor     Pt can follow up at discharge with [facility] by calling  to make an appointment.   Will discuss case with     and update primary team HPI: 60 y/o M Current smoker; Fhx of CAD, with PMH of HTN, HLD, DM, COPD, hep C s/p Harvoni tx, JUANA (non-compliant with CPAP), depression  Who was BIBEMS to [facility] ED 9/20/21 with CC of near syncopal event in the train today. Pt. reports today he was in the train with his wife when he started to sweat profusely all of a sudden. He then got of the train and climbed 1 flight of subway stairs when he started to feel very weak, dizzy, SOB (felt like he was breathing from straw), diaphoretic/nauseous and felt like his chest getting crushed and felt like he was abt to pass out. His wife saw 2 police officers who called 911 for him. As per ER s/o received from EMS, pt hypotensive on scene and received  cc. Currently; pt. comfortable with no sx. He reports that one week ago; when he was trying to catch his bus; he had crushing chest tightness X 1 episode. Pt. denies any chest pain, SOB, dizziness, palpitation, N/V, LE edema, PND/orthopnea, syncope, fever, chills, recent travel and sick contact.  In ED, /71 HR 72 RR 18 T 97.9 02 98 RA, Labs revealed Trop T neg X 1, COVID negative, Cr. 1.70, CXR clear, EKG: NSR @ 67 bpm; TWI lead 2 and 3 (new TWI compared to EKG 03/2020).  In ED, Pt. received  mg PO X 1 and is now admitted to cardiology service for further management of chest pain.         Age at Dx: 45 yrs  How dx: Episode of Diaphrosis was diagnosed at [facility].  Hx and duration of insulin: 10 years  Current Therapy: Metformin 1000mg BID, Humalog 10 U Premeal.  No Hx of hypoglycemia  No Hx of DKA/HHS?    Home FSG: Can range from 150 to 250    Hx of other regimens  Complications: c/o Upper and lower extremity numbness and tingling. Pt is on Lyrica.  Outpatient Endo: [facility], but he is noon adherent. Medications are send by PCP.    PMH & Surgical Hx:NEAR SYNCOPE; CHEST PAIN    FH: CAD (coronary artery disease) (Uncle)    FH: diabetes mellitus (Mother)    Handoff    MEWS Score    Depressive disorder    Obstructive sleep apnea syndrome    Hepatitis C virus infection    Type 2 diabetes mellitus    Chronic obstructive pulmonary disease    Essential hypertension    Impotence of organic origin    Near syncope    ACS (acute coronary syndrome)    HTN (hypertension)    HLD (hyperlipidemia)    DM (diabetes mellitus)    Depression    History of COPD    MESSI (acute kidney injury)    Other postprocedural status    Other postprocedural status    Other postprocedural status    NEAR SYNCOPE    90+    Chest pain    SysAdmin_VisitLink        FH:  DM: Mother  Thyroid: Sister (pt unsure if it is hyper or hypo)  Autoimmune: None  Other:    SH:  Smoking Active smoker since the past 40 Yrs 0.5 PPD  Recreational Drugs: None      Current Meds:  ALBUTerol    90 MICROgram(s) HFA Inhaler 2 Puff(s) Inhalation every 6 hours PRN  amLODIPine   Tablet 10 milliGRAM(s) Oral daily  aspirin enteric coated 81 milliGRAM(s) Oral daily  atorvastatin 80 milliGRAM(s) Oral at bedtime  budesonide  80 MICROgram(s)/formoterol 4.5 MICROgram(s) Inhaler 2 Puff(s) Inhalation two times a day  dextrose 40% Gel 15 Gram(s) Oral once  dextrose 5%. 1000 milliLiter(s) IV Continuous <Continuous>  dextrose 5%. 1000 milliLiter(s) IV Continuous <Continuous>  dextrose 50% Injectable 25 Gram(s) IV Push once  dextrose 50% Injectable 12.5 Gram(s) IV Push once  dextrose 50% Injectable 25 Gram(s) IV Push once  glucagon  Injectable 1 milliGRAM(s) IntraMuscular once  heparin   Injectable 5000 Unit(s) SubCutaneous every 8 hours  influenza   Vaccine 0.5 milliLiter(s) IntraMuscular once  insulin glargine Injectable (LANTUS) 40 Unit(s) SubCutaneous at bedtime  insulin lispro (ADMELOG) corrective regimen sliding scale   SubCutaneous Before meals and at bedtime  insulin lispro Injectable (ADMELOG) 6 Unit(s) SubCutaneous three times a day before meals  labetalol 300 milliGRAM(s) Oral two times a day  pregabalin 100 milliGRAM(s) Oral three times a day  sodium chloride 0.9%. 500 milliLiter(s) IV Continuous <Continuous>      Allergies:  No Known Allergies  Originally Entered as [Other - Mild] reaction to [Other][cantalope [ throat itchy and irritated]] (Other)      ROS:  Denies the following except as indicated.    General: weight loss/weight gain, decreased appetite, fatigue  Eyes: Blurry vision, double vision, visual changes  ENT: Throat pain, changes in voice,   CV: palpitations, SOB, CP, cough  GI: NVD, difficulty swallowing, abdominal pain  : polyuria, dysuria  Endo: abnormal menses, temperature intolerance, decreased libido  MSK: weakness, joint pain  Skin: rash, dryness, diaphoresis  Heme: Easy bruising,bleeding  Neuro: HA, dizziness, lightheadedness, numbness tingling  Psych: Anxiety, Depression    Vital Signs Last 24 Hrs  T(C): 36.5 (21 Sep 2021 10:08), Max: 36.8 (20 Sep 2021 19:50)  T(F): 97.7 (21 Sep 2021 10:08), Max: 98.2 (20 Sep 2021 19:50)  HR: 69 (21 Sep 2021 08:35) (63 - 84)  BP: 119/67 (21 Sep 2021 08:35) (110/61 - 160/85)  BP(mean): --  RR: 16 (21 Sep 2021 08:35) (16 - 18)  SpO2: 98% (21 Sep 2021 08:35) (93% - 99%)  Height (cm): 175.3 (09-20 @ 15:56)  Weight (kg): 86.2 (09-20 @ 15:56)  BMI (kg/m2): 28.1 (09-20 @ 15:56)      Constitutional: wn/wd in NAD.   HEENT: NCAT, MMM, OP clear, EOMI, , no proptosis or lid retraction  Neck: no thyromegaly or palpable thyroid nodules   Respiratory: lungs CTAB.  Cardiovascular: regular rhythm, normal S1 and S2, no audible murmurs, no peripheral edema  GI: soft, NT/ND, no masses/HSM appreciated.  Neurology: no tremors, DTR 2+  Skin: no visible rashes/lesions  Psychiatric: AAO x 3, normal affect/mood.  Ext: radial pulses intact, DP pulses intact, extremities warm, no cyanosis, clubbing or edema.       LABS:                        13.0   8.34  )-----------( 239      ( 21 Sep 2021 06:50 )             38.2     09-21    137  |  101  |  28<H>  ----------------------------<  184<H>  3.9   |  23  |  1.71<H>    Ca    9.3      21 Sep 2021 06:50  Mg     2.2     09-21      PT/INR - ( 21 Sep 2021 06:50 )   PT: 10.6 sec;   INR: 0.88          PTT - ( 21 Sep 2021 06:50 )  PTT:28.7 sec  Urinalysis Basic - ( 21 Sep 2021 03:59 )    Color: Yellow / Appearance: Clear / SG: >=1.030 / pH: x  Gluc: x / Ketone: NEGATIVE  / Bili: Negative / Urobili: 0.2 E.U./dL   Blood: x / Protein: 100 mg/dL / Nitrite: NEGATIVE   Leuk Esterase: NEGATIVE / RBC: < 5 /HPF / WBC < 5 /HPF   Sq Epi: x / Non Sq Epi: 0-5 /HPF / Bacteria: Present /HPF            RADIOLOGY & ADDITIONAL STUDIES:  CAPILLARY BLOOD GLUCOSE      POCT Blood Glucose.: 200 mg/dL (21 Sep 2021 06:33)  POCT Blood Glucose.: 283 mg/dL (21 Sep 2021 02:58)  POCT Blood Glucose.: 458 mg/dL (21 Sep 2021 00:11)  POCT Blood Glucose.: 479 mg/dL (21 Sep 2021 00:07)  POCT Blood Glucose.: 474 mg/dL (20 Sep 2021 21:49)  POCT Blood Glucose.: 163 mg/dL (20 Sep 2021 16:04)        A/P:61y Male    1.  DM  Please continue lantus 50  units at night.  Please continue lispro 10 units before each meal.  Please continue lispro moderate / low dose sliding scale four times daily with meals and at bedtime    Pt's fingerstick glucose goal is 100-180 mg/dL    Will continue to monitor     Pt can follow up at discharge with [facility] by calling  to make an appointment.   Will discuss case with     and update primary team HPI: 60 y/o M Current smoker; Fhx of CAD, with PMH of HTN, HLD, DM, COPD, hep C s/p Harvoni tx, JUANA (non-compliant with CPAP), depression  Who was BIBEMS to [facility] ED 9/20/21 with CC of near syncopal event in the train today. Pt. reports today he was in the train with his wife when he started to sweat profusely all of a sudden. He then got of the train and climbed 1 flight of subway stairs when he started to feel very weak, dizzy, SOB (felt like he was breathing from straw), diaphoretic/nauseous and felt like his chest getting crushed and felt like he was abt to pass out. His wife saw 2 police officers who called 911 for him. As per ER s/o received from EMS, pt hypotensive on scene and received  cc. Currently; pt. comfortable with no sx. He reports that one week ago; when he was trying to catch his bus; he had crushing chest tightness X 1 episode. Pt. denies any chest pain, SOB, dizziness, palpitation, N/V, LE edema, PND/orthopnea, syncope, fever, chills, recent travel and sick contact.  In ED, /71 HR 72 RR 18 T 97.9 02 98 RA, Labs revealed Trop T neg X 1, COVID negative, Cr. 1.70, CXR clear, EKG: NSR @ 67 bpm; TWI lead 2 and 3 (new TWI compared to EKG 03/2020).  In ED, Pt. received  mg PO X 1 and is now admitted to cardiology service for further management of chest pain.         Age at Dx: 45 yrs  How dx: Episode of Diaphrosis was diagnosed at [facility].  Hx and duration of insulin: 10 years  Current Therapy: Metformin 1000mg BID, Humalog 10 U Premeal.  No Hx of hypoglycemia  No Hx of DKA/HHS?    Home FSG: Can range from 150 to 250    Hx of other regimens  Complications: c/o Upper and lower extremity numbness and tingling. Pt is on Lyrica.  Outpatient Endo: [facility], but he is noon adherent. Medications are send by PCP.    PMH & Surgical Hx:NEAR SYNCOPE; CHEST PAIN    FH: CAD (coronary artery disease) (Uncle)    FH: diabetes mellitus (Mother)    Handoff    MEWS Score    Depressive disorder    Obstructive sleep apnea syndrome    Hepatitis C virus infection    Type 2 diabetes mellitus    Chronic obstructive pulmonary disease    Essential hypertension    Impotence of organic origin    Near syncope    ACS (acute coronary syndrome)    HTN (hypertension)    HLD (hyperlipidemia)    DM (diabetes mellitus)    Depression    History of COPD    MESSI (acute kidney injury)    Other postprocedural status    Other postprocedural status    Other postprocedural status    NEAR SYNCOPE    90+    Chest pain    SysAdmin_VisitLink        FH:  DM: Mother  Thyroid: Sister (pt unsure if it is hyper or hypo)  Autoimmune: None  Other:    SH:  Smoking Active smoker since the past 40 Yrs 0.5 PPD  Recreational Drugs: None      Current Meds:  ALBUTerol    90 MICROgram(s) HFA Inhaler 2 Puff(s) Inhalation every 6 hours PRN  amLODIPine   Tablet 10 milliGRAM(s) Oral daily  aspirin enteric coated 81 milliGRAM(s) Oral daily  atorvastatin 80 milliGRAM(s) Oral at bedtime  budesonide  80 MICROgram(s)/formoterol 4.5 MICROgram(s) Inhaler 2 Puff(s) Inhalation two times a day  dextrose 40% Gel 15 Gram(s) Oral once  dextrose 5%. 1000 milliLiter(s) IV Continuous <Continuous>  dextrose 5%. 1000 milliLiter(s) IV Continuous <Continuous>  dextrose 50% Injectable 25 Gram(s) IV Push once  dextrose 50% Injectable 12.5 Gram(s) IV Push once  dextrose 50% Injectable 25 Gram(s) IV Push once  glucagon  Injectable 1 milliGRAM(s) IntraMuscular once  heparin   Injectable 5000 Unit(s) SubCutaneous every 8 hours  influenza   Vaccine 0.5 milliLiter(s) IntraMuscular once  insulin glargine Injectable (LANTUS) 40 Unit(s) SubCutaneous at bedtime  insulin lispro (ADMELOG) corrective regimen sliding scale   SubCutaneous Before meals and at bedtime  insulin lispro Injectable (ADMELOG) 6 Unit(s) SubCutaneous three times a day before meals  labetalol 300 milliGRAM(s) Oral two times a day  pregabalin 100 milliGRAM(s) Oral three times a day  sodium chloride 0.9%. 500 milliLiter(s) IV Continuous <Continuous>      Allergies:  No Known Allergies  Originally Entered as [Other - Mild] reaction to [Other][cantalope [ throat itchy and irritated]] (Other)      ROS:  Denies the following except as indicated.    General: weight loss/weight gain, decreased appetite, fatigue  Eyes: Blurry vision, double vision, visual changes  ENT: Throat pain, changes in voice,   CV: palpitations, SOB, CP, cough  GI: NVD, difficulty swallowing, abdominal pain  : polyuria, dysuria  Endo: abnormal menses, temperature intolerance, decreased libido  MSK: weakness, joint pain  Skin: rash, dryness, diaphoresis  Heme: Easy bruising,bleeding  Neuro: HA, dizziness, lightheadedness, numbness tingling  Psych: Anxiety, Depression    Vital Signs Last 24 Hrs  T(C): 36.5 (21 Sep 2021 10:08), Max: 36.8 (20 Sep 2021 19:50)  T(F): 97.7 (21 Sep 2021 10:08), Max: 98.2 (20 Sep 2021 19:50)  HR: 69 (21 Sep 2021 08:35) (63 - 84)  BP: 119/67 (21 Sep 2021 08:35) (110/61 - 160/85)  BP(mean): --  RR: 16 (21 Sep 2021 08:35) (16 - 18)  SpO2: 98% (21 Sep 2021 08:35) (93% - 99%)  Height (cm): 175.3 (09-20 @ 15:56)  Weight (kg): 86.2 (09-20 @ 15:56)  BMI (kg/m2): 28.1 (09-20 @ 15:56)      Constitutional: wn/wd in NAD.   HEENT: NCAT, MMM, OP clear, EOMI, , no proptosis or lid retraction  Neck: no thyromegaly or palpable thyroid nodules   Respiratory: lungs CTAB.  Cardiovascular: regular rhythm, normal S1 and S2, no audible murmurs, no peripheral edema  GI: soft, NT/ND, no masses/HSM appreciated.  Neurology: no tremors, DTR 2+  Skin: no visible rashes/lesions  Psychiatric: AAO x 3, normal affect/mood.  Ext: radial pulses intact, DP pulses intact, extremities warm, no cyanosis, clubbing or edema.       LABS:                        13.0   8.34  )-----------( 239      ( 21 Sep 2021 06:50 )             38.2     09-21    137  |  101  |  28<H>  ----------------------------<  184<H>  3.9   |  23  |  1.71<H>    Ca    9.3      21 Sep 2021 06:50  Mg     2.2     09-21      PT/INR - ( 21 Sep 2021 06:50 )   PT: 10.6 sec;   INR: 0.88          PTT - ( 21 Sep 2021 06:50 )  PTT:28.7 sec  Urinalysis Basic - ( 21 Sep 2021 03:59 )    Color: Yellow / Appearance: Clear / SG: >=1.030 / pH: x  Gluc: x / Ketone: NEGATIVE  / Bili: Negative / Urobili: 0.2 E.U./dL   Blood: x / Protein: 100 mg/dL / Nitrite: NEGATIVE   Leuk Esterase: NEGATIVE / RBC: < 5 /HPF / WBC < 5 /HPF   Sq Epi: x / Non Sq Epi: 0-5 /HPF / Bacteria: Present /HPF            RADIOLOGY & ADDITIONAL STUDIES:  CAPILLARY BLOOD GLUCOSE      POCT Blood Glucose.: 200 mg/dL (21 Sep 2021 06:33)  POCT Blood Glucose.: 283 mg/dL (21 Sep 2021 02:58)  POCT Blood Glucose.: 458 mg/dL (21 Sep 2021 00:11)  POCT Blood Glucose.: 479 mg/dL (21 Sep 2021 00:07)  POCT Blood Glucose.: 474 mg/dL (20 Sep 2021 21:49)  POCT Blood Glucose.: 163 mg/dL (20 Sep 2021 16:04)        A/P:61y Male    1.  DM  Please continue lantus 50 units at night.  Please continue lispro 10 units before each meal.  Please continue lispro moderate / low dose sliding scale four times daily with meals and at bedtime    Pt's fingerstick glucose goal is 100-180 mg/dL    Will continue to monitor     Pt can follow up at discharge with [facility] by calling  to make an appointment.   Will discuss case with     and update primary team

## 2021-09-21 NOTE — PROGRESS NOTE ADULT - PROBLEM SELECTOR PLAN 1
- currently chest pain free and with no SOB  - Trop T neg X 1; f/u 10 pm Trop T and am Trop;   - EKG: NSR with TWI lead 2,3 (new compared to 03/2020); f/u am EKG  - ECHO ordered. f/u   - NPO after MN for ischemic eval in am; discuss with DR. Disla Rule out ACS. Reports 2 episodes of exertional crushing chest tightness, a/w SOB, dizziness, weakness prior to admission and 1 week ago.   - currently chest pain free and with no SOB  - Trop negative x 3  - EKG: NSR with new TWI inferolaterally  - ECHO 9/21/21: EF 50-55%, mild concentric LVH  - S/p ASA 325mg load 9/20, Plavix 600mg load 9/21  - c/w ASA 81mg qd, Plavix 75mg qd, Lipitor 80mg qd  - Plan for cardiac cath today w/ Dr Azar given risk factors: current smoker, uncontrolled DM (A1c 12.8), uncontrolled HLD.  - Cath consent in chart.  Mallampati Class IV.   ASA Class II  Pt is a suitable candidate for moderate sedation.   Risks & benefits of procedure and alternative therapy have been explained to the patient including but not limited to: allergic reaction, bleeding w/possible need for blood transfusion, infection, renal and vascular compromise, limb damage, arrhythmia, stroke, vessel dissection/perforation, Myocardial infarction, emergent CABG. Informed consent obtained and in chart.

## 2021-09-21 NOTE — DISCHARGE NOTE PROVIDER - NSDCCPTREATMENT_GEN_ALL_CORE_FT
PRINCIPAL PROCEDURE  Procedure: 2D echocardiography  Findings and Treatment: CONCLUSIONS:   1. No significant valvular disease.   2. The right ventricle is normal in size. Right ventricular systolic function is normal.   3. There is mild concentric left ventricular hypertrophy. The left ventricle is normal in size and systolic function with a calculated ejection fraction of 50-55%.   4. No pericardial effusion.   5. No prior echo is available for comparison.

## 2021-09-21 NOTE — DISCHARGE NOTE PROVIDER - CARE PROVIDER_API CALL
Danis Sandoval  [address] [place] [address], [address] [place] [address]  [phone]  Phone: [phone]  Fax: (   )    -  Established Patient  Follow Up Time: 2 weeks

## 2021-09-21 NOTE — PROGRESS NOTE ADULT - PROBLEM SELECTOR PLAN 4
- c/w home lipitor 80 mg daily  - f/u am lipid profile - Elevated lipid panel: T chol 327, Tri 458, HDL 39,   - c/w Lipitor 80 mg daily (pt reports was non-compliant w/ statin prior to admission, now agrees to take)  - Preventive cardiology consulted

## 2021-09-21 NOTE — PROGRESS NOTE ADULT - PROBLEM SELECTOR PLAN 7
- c/w albuterol PRN  - c/w BUdoneside-for BID    DVT PPX: Hep SubQ  Dispo: NPO after MN for ischemic eval in am. - c/w Albuterol PRN  - c/w Budesonide INH BID    DVT PPX: Hep SubQ  Dispo: Admit to cardiac tele, plan for cardiac cath today

## 2021-09-21 NOTE — PROVIDER CONTACT NOTE (OTHER) - BACKGROUND
Pt A&Ox4, admitted for near syncopal episode and chest pain. Pt due for 40 units of Lantus, was rescheduled from 10pm due to not receiving the medication from pharmacy.

## 2021-09-21 NOTE — CONSULT NOTE ADULT - ATTENDING COMMENTS
Pt seen on rounds this afternoon.  61-yo man [place] found to have ischemic EKG changes in the anterior wall leads.  Underwent cath today, had stenting of a mid-LAD lesion.  Has a 16-yr history of type 2 DM, insulin-requiring for 10 years.  Fingersticks at home have been in the 200-300 range, and A1c level on admission was 12.8%.  Dietary lapses to not appear to be frequent or severe, and he likely needs higher pre-meal insulin doses.  Fingersticks today 200-232.  (The AM of 200 mg% was after he received 40 units of Lantus last night--usually takes 60 units at home)  Will increase the Lantus to 50 units, but continue his usual pre-meal dose of 10 units for tonight, then re-evaluate tomorrow.  LDL-cholesterol level is markedly elevated--had not been taking the prescribed statin at home

## 2021-09-21 NOTE — CONSULT NOTE ADULT - SUBJECTIVE AND OBJECTIVE BOX
Preventive Cardiology Consultation Note    CHIEF COMPLAINT: s/p cardiac catheterization requiring cardiovascular prevention optimization and education    HISTORY OF PRESENT ILLNESS:  Mr Smith is a 60yo M with HTN, HL, DM, COPD, JUANA, hep C, current smoker who presented with weakness/dizziness/chest pain and now s/p LHC/PCI to mLAD. EF 55%.     Currently reports feeling well.     Risk factors reviewed:  -reports has not been compliant some meds, especially atorvastatin which he has not been taking because he was concerned would interact with other meds  -DM: reports A1c was as high as 17% at one point. On metformin and insulin. Managed by internist but has not seen him in a long time. +Neuropathy. May also have retinopathy, due for follow-up with ophtho.   -JUANA: has not been compliant with CPAP    Lifestyle History:  Diet: eats chicken with vegetables, also white rice/mashed potatoes, soda, orange juice; red meat once a month, occasional fish   Exercise: does weights a couple of times a week; overall capacity limited by hip pain.   Smoking: Current smoker, was smoking 1.5ppd now down to 10 cigs/day. Has tried nicotine replacement/Chantix in the past, does not want to try again.     PAST MEDICAL & SURGICAL HISTORY:  Depressive disorder  Depressive disorder  Obstructive sleep apnea syndrome  Obstructive sleep apnea  Hepatitis C virus infection  Hepatitis C  Type 2 diabetes mellitus  DM (diabetes mellitus)  Chronic obstructive pulmonary disease  COPD (chronic obstructive pulmonary disease)  Essential hypertension  HTN (hypertension)  Impotence of organic origin  ED (erectile dysfunction)  Other postprocedural status  2014  Other postprocedural status  History of tonsillectomy  Other postprocedural status  History of penile implant    FAMILY HISTORY:   uncles and aunt with MI  mother: DM  sister: thyroid problem  kids: healthy    Allergies:   No Known Allergies  Originally Entered as [Other - Mild] reaction to [Other][cantalope [ throat itchy and irritated]] (Other)    HOME MEDICATIONS:  Home Medications:  albuterol 90 mcg/inh inhalation aerosol: 2 puff(s) inhaled every 6 hours, As Needed (20 Sep 2021 20:20)  amLODIPine 10 mg oral tablet: 1 tab(s) orally once a day (20 Sep 2021 20:20)  aspirin 81 mg oral tablet: 1 tab(s) orally once a day (20 Sep 2021 20:20)  budesonide-formoterol 80 mcg-4.5 mcg/inh inhalation aerosol: 2 puff(s) inhaled 2 times a day (20 Sep 2021 20:20)  etodolac 400 mg oral tablet: 1 tab(s) orally 2 times a day (20 Sep 2021 20:20)  HumaLOG 100 units/mL subcutaneous solution: 10 unit(s) subcutaneous 3 times a day (before meals) (20 Sep 2021 20:20)  labetalol 300 mg oral tablet: 1 tab(s) orally 2 times a day (20 Sep 2021 20:20)  Lantus 100 units/mL subcutaneous solution: 60 unit(s) subcutaneous once a day (at bedtime) (20 Sep 2021 20:20)  lidocaine 4% patch: orally once a day (20 Sep 2021 20:20)  Lipitor 80 mg oral tablet: 1 tab(s) orally once a day (20 Sep 2021 20:20) -- not taking  losartan 100 mg oral tablet: 1 tab(s) orally once a day (20 Sep 2021 20:20)  Lyrica 100 mg oral capsule: 1 cap(s) orally 3 times a day (20 Sep 2021 20:20)  metFORMIN 1000 mg oral tablet: 1 tab(s) orally 2 times a day (20 Sep 2021 20:20)      PHYSICAL EXAM:  T(C): 36.3 (09-21-21 @ 13:06), Max: 36.8 (09-20-21 @ 19:50)  T(F): 97.4 (09-21-21 @ 13:06), Max: 98.2 (09-20-21 @ 19:50)  HR: 70 (09-21-21 @ 17:09) (63 - 84)  BP: 171/101 (09-21-21 @ 17:09) (110/61 - 171/101)  RR: 18 (09-21-21 @ 17:09) (16 - 18)  SpO2: 98% (09-21-21 @ 17:09) (93% - 99%)  Wt(kg): --  Height (cm): 175.3 (09-20 @ 15:56)  Weight (kg): 86.2 (09-20 @ 15:56)  BMI (kg/m2): 28.1 (09-20 @ 15:56)  	  Gen- well appearing, NAD    LABS:	                        13.0   8.34  )-----------( 239      ( 21 Sep 2021 06:50 )             38.2     09-21    137  |  101  |  28<H>  ----------------------------<  184<H>  3.9   |  23  |  1.71<H>    Ca    9.3      21 Sep 2021 06:50  Mg     2.2     09-21    Thyroid Stimulating Hormone, Serum: 1.660 uIU/mL (09-21 @ 06:50)    Cholesterol, Serum: 327 mg/dL (09-21 @ 06:50)  HDL Cholesterol, Serum: 39 mg/dL (09-21 @ 06:50)  Triglycerides, Serum: 458 mg/dL (09-21 @ 06:50)  LDL Cholesterol, Calculated: 196 mg/dL  Shila calculated LDL: 220mg/dL  HbA1c: 12.8%    ASSESSMENT/RECOMMENDATIONS: 	  Patient's dietary, exercise and overall lifestyle habits were reviewed. The concept of atherosclerosis and its systemic nature was discussed with a focus on the need to get all cardiovascular risk factors to goal. At this time, I would like to make the following recommendations to optimize atherosclerotic risk factors.     Multiple uncontrolled risk factors. Reviewed with pt in detail.   -Lipid therapy: severely elevated LDL and triglycerides. Has not been taking statin. Agree with high intensity statin. As technically new med for him, would check baseline LFTs. Reviewed dietary modifications (does not drink etoh). Discussed carb/sweetened food reduction. Technically given LDL (which are likely underestimated) would be a candidate for FH testing as outpatient though may be driven by longstanding med noncompliance and dietary patterns. Should have close follow-up (recheck lipid profile in 6 weeks) to assess if further medications needed.   -Diet: suboptimal. Heart healthy dietary patterns reviewed and nutrition referral provided. Recommend reduce simple carbs, sugary foods/juices/soda. Incorporate 2 or more servings per day of vegetables, fruits, and whole grains.   -Smoking: Current smoker; reviewed risks of continued smoking. Does not want replacement/pharmacologic measures. Provided [facility] smoking cessation group #. He will continue to work on this.   -Glucometabolic State: A1c elevated and likely driving elevation of triglycerides as well. Please set up with endocrine here as per patient preference. Discussed addition of SGLT2i or GLP1RA. Though he is trying to be open to more medications, he is interested in the GLP1RA injectables. Given possible diabetic retinopathy, would consider starting once he has been evaluated by ophtho. Otherwise would consider SGLT2i such as Jardiance. He may need both given his glucose control.   -Exercise: Recommended gradually increasing activity to 30-45 minutes most days of the week once cleared by referring cardiologist. Cardiac rehab might benefit this patient and is covered by major insurance plans (other than co-pays), please refer.   -Sleep Apnea: Recommended recomply with mask at home.   -Hypertension: Blood pressure currently elevated, may need home med adjustment.   -Anti-platelet Therapy: DAPT per interventionalist recommendation.   -Overweight: To start with dietary modifications and increased exercise as above.     Thank you for the opportunity to see this patient. Please feel free to contact Prevention if there are any questions, or if you feel that your patient would benefit from continued follow-up visits with the Program.    Debra Dodge MD  Cardiovascular Prevention

## 2021-09-21 NOTE — DISCHARGE NOTE PROVIDER - NSDCCPCAREPLAN_GEN_ALL_CORE_FT
PRINCIPAL DISCHARGE DIAGNOSIS  Diagnosis: Unstable angina  Assessment and Plan of Treatment: You came into the hospita with recurring episode of chest pain and feeling like you were about to pass out. You underwent cardiac testing and was found to have an abnormal EKG (T wave inversions). Due to your risk factors, you underwent a cardiac catheterization where a drug-eluting cardiac stent was placed to blockage in the Left Anterior Descending Artery. You will need to take blood thinning medications Asprin and Plavix daily for the cardiac stent. DO NOT STOP taking these medications unless directed by your cardiologist as this can be LIFE THREATENING and lead to closing up of the cardiac stent and cause a heart attack!      SECONDARY DISCHARGE DIAGNOSES  Diagnosis: MESSI (acute kidney injury)  Assessment and Plan of Treatment: On admission to the hospital, you were found to have abnormally high kidney level (creatinine 1.7). Due to this your blood pressure medication Losartan was STOPPED.    Diagnosis: HLD (hyperlipidemia)  Assessment and Plan of Treatment: You were found to have very high cholesterol levels: Total cholesterol 458, Triglycerides 327, . You will need to take cholesterol medication Lipitor 80mg once a day to prevent against further plaque buildup in your arteries.    Diagnosis: Current smoker  Assessment and Plan of Treatment: If you smoke and already have heart or vascular disease, quitting smoking will reduce your risk of sudden cardiac death, heart attack, and death from other chronic diseases. Any amount of smoking, even light smoking or occasional smoking, damages the heart and blood vessels. One of the best ways to reduce your risk of heart disease is to avoid tobacco smoke. No matter how much or how long you've smoked, quitting will benefit you. We have provided you with resources to help with smoking cessation and we strongly recommend that you use the information provided and follow up with your outpatient physician to help with setting up a “quit date” if you have not already.     PRINCIPAL DISCHARGE DIAGNOSIS  Diagnosis: Unstable angina  Assessment and Plan of Treatment: You came into the hospital with recurring episode of chest pain and feeling like you were about to pass out. You underwent cardiac evaluation and was found to have an abnormal EKG (T wave inversions). Due to your risk factors, you underwent a cardiac catheterization where a drug-eluting cardiac stent was placed to blockage in the Left Anterior Descending Artery. You will need to take blood thinning medications Asprin and Plavix daily for the cardiac stent. DO NOT STOP taking these medications unless directed by your cardiologist as this can be LIFE THREATENING and lead to closing up of the cardiac stent and cause a heart attack! You will also need to resume taking cholesterol medication Lipitor 80mg once a day to help lower your cholesterol.      SECONDARY DISCHARGE DIAGNOSES  Diagnosis: MESSI (acute kidney injury)  Assessment and Plan of Treatment: On admission to the hospital, you were found to have abnormally high kidney level (creatinine 1.7) and received intraveous hydration. Your creatinine level improved to 1.39 on day of discharge. Your Losartan was held in the hospital, but you may resume in tomorrow on 9/23/21.    Diagnosis: HLD (hyperlipidemia)  Assessment and Plan of Treatment: You were found to have very high cholesterol levels: Total cholesterol 458, Triglycerides 327, . You will need to take cholesterol medication Lipitor 80mg once a day to prevent against further plaque buildup in your arteries.    Diagnosis: Current smoker  Assessment and Plan of Treatment: If you smoke and already have heart or vascular disease, quitting smoking will reduce your risk of sudden cardiac death, heart attack, and death from other chronic diseases. Any amount of smoking, even light smoking or occasional smoking, damages the heart and blood vessels. One of the best ways to reduce your risk of heart disease is to avoid tobacco smoke. No matter how much or how long you've smoked, quitting will benefit you. We have provided you with resources to help with smoking cessation and we strongly recommend that you use the information provided and follow up with your outpatient physician to help with setting up a “quit date” if you have not already.     PRINCIPAL DISCHARGE DIAGNOSIS  Diagnosis: Unstable angina  Assessment and Plan of Treatment: You came into the hospital with recurring episode of chest pain and feeling like you were about to pass out. You underwent cardiac evaluation and was found to have an abnormal EKG (T wave inversions). Due to your risk factors, you underwent a cardiac catheterization where a drug-eluting cardiac stent was placed to blockage in the Left Anterior Descending Artery. You will need to take blood thinning medications Asprin and Plavix daily for the cardiac stent. DO NOT STOP taking these medications unless directed by your cardiologist as this can be LIFE THREATENING and lead to closing up of the cardiac stent and cause a heart attack! You will also need to resume taking cholesterol medication Lipitor 80mg once a day to help lower your cholesterol.      SECONDARY DISCHARGE DIAGNOSES  Diagnosis: MESSI (acute kidney injury)  Assessment and Plan of Treatment: On admission to the hospital, you were found to have abnormally high kidney level (creatinine 1.7) and received intraveous hydration. Your creatinine level improved to 1.39 on day of discharge. Your Losartan was held in the hospital, but you may resume in tomorrow on 9/23/21.    Diagnosis: HLD (hyperlipidemia)  Assessment and Plan of Treatment: You were found to have very high cholesterol levels: Total cholesterol 458, Triglycerides 327, . You will need to take cholesterol medication Lipitor 80mg once a day to prevent against further plaque buildup in your arteries.    Diagnosis: Current smoker  Assessment and Plan of Treatment: If you smoke and already have heart or vascular disease, quitting smoking will reduce your risk of sudden cardiac death, heart attack, and death from other chronic diseases. Any amount of smoking, even light smoking or occasional smoking, damages the heart and blood vessels. One of the best ways to reduce your risk of heart disease is to avoid tobacco smoke. No matter how much or how long you've smoked, quitting will benefit you. We have provided you with resources to help with smoking cessation and we strongly recommend that you use the information provided and follow up with your outpatient physician to help with setting up a “quit date” if you have not already.    Diagnosis: Diabetes mellitus  Assessment and Plan of Treatment: Your HgA1c level was very high at 12.8. You were seen by Endocrinologist and recommended to changed insulin regimen to: Lantus (long acting) 50 units every night, and Lispro (short acting) 12 units three times a day before each meal. You received contrast during the cardiac catheterization procedure which can cause kidney abnornality when combined with with your diabetic medication Metformin. Please HOLD Metformin for 2 days after the procedure, you may RESUME on 9/24/21.

## 2021-09-21 NOTE — DISCHARGE NOTE PROVIDER - NSDCMRMEDTOKEN_GEN_ALL_CORE_FT
albuterol 90 mcg/inh inhalation aerosol: 2 puff(s) inhaled every 6 hours, As Needed  amLODIPine 10 mg oral tablet: 1 tab(s) orally once a day  aspirin 81 mg oral tablet: 1 tab(s) orally once a day  budesonide-formoterol 80 mcg-4.5 mcg/inh inhalation aerosol: 2 puff(s) inhaled 2 times a day  etodolac 400 mg oral tablet: 1 tab(s) orally 2 times a day  HumaLOG 100 units/mL subcutaneous solution: 10 unit(s) subcutaneous 3 times a day (before meals)  labetalol 300 mg oral tablet: 1 tab(s) orally 2 times a day  Lantus 100 units/mL subcutaneous solution: 60 unit(s) subcutaneous once a day (at bedtime)  lidocaine 4% patch: orally once a day  Lipitor 80 mg oral tablet: 1 tab(s) orally once a day  losartan 100 mg oral tablet: 1 tab(s) orally once a day  Lyrica 100 mg oral capsule: 1 cap(s) orally 3 times a day  metFORMIN 1000 mg oral tablet: 1 tab(s) orally 2 times a day   albuterol 90 mcg/inh inhalation aerosol: 2 puff(s) inhaled every 6 hours, As Needed  amLODIPine 10 mg oral tablet: 1 tab(s) orally once a day  aspirin 81 mg oral delayed release tablet: 1 tab(s) orally once a day  atorvastatin 80 mg oral tablet: 1 tab(s) orally once a day  budesonide-formoterol 80 mcg-4.5 mcg/inh inhalation aerosol: 2 puff(s) inhaled 2 times a day  Cardiac rehabilitation. 3 times a week for 12 weeks. Dx CAD s/p PCI. Outpatient cardiologist Dr Danis Sandoval [phone]:   carvedilol 6.25 mg oral tablet: 1 tab(s) orally every 12 hours  clopidogrel 75 mg oral tablet: 1 tab(s) orally once a day  HumaLOG 100 units/mL subcutaneous solution: 10 unit(s) subcutaneous 3 times a day (before meals)  Lantus 100 units/mL subcutaneous solution: 60 unit(s) subcutaneous once a day (at bedtime)  lidocaine 4% patch: orally once a day  losartan 100 mg oral tablet: 1 tab(s) orally once a day, Resume on 9/23/21  Lyrica 100 mg oral capsule: 1 cap(s) orally 3 times a day  metFORMIN 1000 mg oral tablet: 1 tab(s) orally 2 times a day   albuterol 90 mcg/inh inhalation aerosol: 2 puff(s) inhaled every 6 hours, As Needed  amLODIPine 10 mg oral tablet: 1 tab(s) orally once a day  aspirin 81 mg oral delayed release tablet: 1 tab(s) orally once a day  atorvastatin 80 mg oral tablet: 1 tab(s) orally once a day  budesonide-formoterol 80 mcg-4.5 mcg/inh inhalation aerosol: 2 puff(s) inhaled 2 times a day  Cardiac rehabilitation. 3 times a week for 12 weeks. Dx CAD s/p PCI. Outpatient cardiologist Dr Danis Sandoval [phone]:   carvedilol 6.25 mg oral tablet: 1 tab(s) orally every 12 hours  clopidogrel 75 mg oral tablet: 1 tab(s) orally once a day  HumaLOG 100 units/mL injectable solution: 15 unit(s) injectable 3 times a day (before meals)   Lantus Solostar Pen 100 units/mL subcutaneous solution: 50 unit(s) subcutaneous once a day (at bedtime)   lidocaine 4% patch: orally once a day  losartan 100 mg oral tablet: 1 tab(s) orally once a day, Resume on 9/23/21  Lyrica 100 mg oral capsule: 1 cap(s) orally 3 times a day  metFORMIN 1000 mg oral tablet: 1 tab(s) orally 2 times a day. HOLD for 2 days, RESUME on 9/24/21

## 2021-09-21 NOTE — PROVIDER CONTACT NOTE (OTHER) - ASSESSMENT
VSS, pt asymptomatic. Pt ate 1 sandwich and 4 saltines after previously receiving 18 units of lispro.

## 2021-09-21 NOTE — DISCHARGE NOTE PROVIDER - NSDCFUADDINST_GEN_ALL_CORE_FT
- Do NOT drive or operate hazardous machinery for 24 hours. Limit your physical activity for 24-48 hours. Do NOT engage in sports, heavy work or heavy lifting more than 5 lbs for 5 days.   - You MAY shower and wash the area gently with soap and water. BUT no TUB BATHS, HOT TUBS OR SWIMMING FOR 5 DAYS.  - Your procedure was done through your right wrist. If you observe flank bleeding from the puncture site, it is an emergency. Please put direct pressure on the site and go directly to the ER. Bleeding under the skin may also occur and a small "black and blue" may be expected. If the area appears to be expanding or swelling around the puncture site, apply manual compression and go immediately to the nearest ER. If your arm/hand becomes cool or blue and/or you are unable to move it, this must be treated as an emergency, go directly to the nearest ER. Look for signs of infection in the wrist: fever, red streaking of the arm, obvious pus formation and pain.  -If you have any issues or concerns regarding your access site, you may call [facility] Interventional Cardiology at [phone].

## 2021-09-21 NOTE — PROGRESS NOTE ADULT - PROBLEM SELECTOR PLAN 3
- c/w home Labetolol 300 mg BID  - c/w home Norvasc 1- mg daily  - holding home losartan 100 mg daily in the setting of elevated Cr. -140s  - c/w home Labetalol 300 mg BID  - c/w home Norvasc 10 mg daily  - Holding home losartan 100 mg daily in the setting of elevated Crea

## 2021-09-21 NOTE — DISCHARGE NOTE PROVIDER - PROVIDER TOKENS
FREE:[LAST:[Jaime],FIRST:[Danis],PHONE:[phone]],FAX:[(   [phone]    -],ADDRESS:[[address] [place] [address], [address] [place] [address]  [phone]],FOLLOWUP:[2 weeks],ESTABLISHEDPATIENT:[T]]

## 2021-09-21 NOTE — PROGRESS NOTE ADULT - PROBLEM SELECTOR PLAN 2
- Cr 1.71 on admission; on review of My chart Cr 1 05/2021; Cr normal in sunrise 2020  - pt ordered for NS 50 cc/hr X 10 hrs  - f/u am Cr  - f/u UA and urine lytes  - avoid nephrotoxic agents  - holding home losartan - Cr 1.71 on admission; on review of My chart Crea 1 in 05/2021; Crea normal in sunrise 2020  - c/w ongoing IVF hydration pre/post cath: NS 50 cc/hr x 10 hrs  - F/u AM Crea  - FENa 0.2%, prerenal picture  - avoid nephrotoxic agents. Renally dose meds  - holding home losartan

## 2021-09-22 ENCOUNTER — TRANSCRIPTION ENCOUNTER (OUTPATIENT)
Age: 61
End: 2021-09-22

## 2021-09-22 VITALS — TEMPERATURE: 98 F

## 2021-09-22 DIAGNOSIS — Z71.89 OTHER SPECIFIED COUNSELING: ICD-10-CM

## 2021-09-22 LAB
ANION GAP SERPL CALC-SCNC: 10 MMOL/L — SIGNIFICANT CHANGE UP (ref 5–17)
BUN SERPL-MCNC: 29 MG/DL — HIGH (ref 7–23)
CALCIUM SERPL-MCNC: 9.4 MG/DL — SIGNIFICANT CHANGE UP (ref 8.4–10.5)
CHLORIDE SERPL-SCNC: 105 MMOL/L — SIGNIFICANT CHANGE UP (ref 96–108)
CO2 SERPL-SCNC: 25 MMOL/L — SIGNIFICANT CHANGE UP (ref 22–31)
CREAT SERPL-MCNC: 1.39 MG/DL — HIGH (ref 0.5–1.3)
GLUCOSE BLDC GLUCOMTR-MCNC: 332 MG/DL — HIGH (ref 70–99)
GLUCOSE BLDC GLUCOMTR-MCNC: 406 MG/DL — HIGH (ref 70–99)
GLUCOSE BLDC GLUCOMTR-MCNC: 79 MG/DL — SIGNIFICANT CHANGE UP (ref 70–99)
GLUCOSE BLDC GLUCOMTR-MCNC: 97 MG/DL — SIGNIFICANT CHANGE UP (ref 70–99)
GLUCOSE SERPL-MCNC: 109 MG/DL — HIGH (ref 70–99)
HCT VFR BLD CALC: 35.4 % — LOW (ref 39–50)
HGB BLD-MCNC: 12.2 G/DL — LOW (ref 13–17)
MAGNESIUM SERPL-MCNC: 2.1 MG/DL — SIGNIFICANT CHANGE UP (ref 1.6–2.6)
MCHC RBC-ENTMCNC: 29.8 PG — SIGNIFICANT CHANGE UP (ref 27–34)
MCHC RBC-ENTMCNC: 34.5 GM/DL — SIGNIFICANT CHANGE UP (ref 32–36)
MCV RBC AUTO: 86.3 FL — SIGNIFICANT CHANGE UP (ref 80–100)
NRBC # BLD: 0 /100 WBCS — SIGNIFICANT CHANGE UP (ref 0–0)
PLATELET # BLD AUTO: 229 K/UL — SIGNIFICANT CHANGE UP (ref 150–400)
POTASSIUM SERPL-MCNC: 4 MMOL/L — SIGNIFICANT CHANGE UP (ref 3.5–5.3)
POTASSIUM SERPL-SCNC: 4 MMOL/L — SIGNIFICANT CHANGE UP (ref 3.5–5.3)
RBC # BLD: 4.1 M/UL — LOW (ref 4.2–5.8)
RBC # FLD: 12.3 % — SIGNIFICANT CHANGE UP (ref 10.3–14.5)
SODIUM SERPL-SCNC: 140 MMOL/L — SIGNIFICANT CHANGE UP (ref 135–145)
WBC # BLD: 7.41 K/UL — SIGNIFICANT CHANGE UP (ref 3.8–10.5)
WBC # FLD AUTO: 7.41 K/UL — SIGNIFICANT CHANGE UP (ref 3.8–10.5)

## 2021-09-22 PROCEDURE — 80061 LIPID PANEL: CPT

## 2021-09-22 PROCEDURE — 84156 ASSAY OF PROTEIN URINE: CPT

## 2021-09-22 PROCEDURE — C1874: CPT

## 2021-09-22 PROCEDURE — 84540 ASSAY OF URINE/UREA-N: CPT

## 2021-09-22 PROCEDURE — 86769 SARS-COV-2 COVID-19 ANTIBODY: CPT

## 2021-09-22 PROCEDURE — 84484 ASSAY OF TROPONIN QUANT: CPT

## 2021-09-22 PROCEDURE — 81001 URINALYSIS AUTO W/SCOPE: CPT

## 2021-09-22 PROCEDURE — 85610 PROTHROMBIN TIME: CPT

## 2021-09-22 PROCEDURE — 93306 TTE W/DOPPLER COMPLETE: CPT

## 2021-09-22 PROCEDURE — 85730 THROMBOPLASTIN TIME PARTIAL: CPT

## 2021-09-22 PROCEDURE — C1725: CPT

## 2021-09-22 PROCEDURE — 99232 SBSQ HOSP IP/OBS MODERATE 35: CPT | Mod: GC

## 2021-09-22 PROCEDURE — 82962 GLUCOSE BLOOD TEST: CPT

## 2021-09-22 PROCEDURE — C1894: CPT

## 2021-09-22 PROCEDURE — 83036 HEMOGLOBIN GLYCOSYLATED A1C: CPT

## 2021-09-22 PROCEDURE — 85025 COMPLETE CBC W/AUTO DIFF WBC: CPT

## 2021-09-22 PROCEDURE — 84443 ASSAY THYROID STIM HORMONE: CPT

## 2021-09-22 PROCEDURE — C1887: CPT

## 2021-09-22 PROCEDURE — 80048 BASIC METABOLIC PNL TOTAL CA: CPT

## 2021-09-22 PROCEDURE — 84300 ASSAY OF URINE SODIUM: CPT

## 2021-09-22 PROCEDURE — C1769: CPT

## 2021-09-22 PROCEDURE — 85379 FIBRIN DEGRADATION QUANT: CPT

## 2021-09-22 PROCEDURE — 85027 COMPLETE CBC AUTOMATED: CPT

## 2021-09-22 PROCEDURE — 99239 HOSP IP/OBS DSCHRG MGMT >30: CPT

## 2021-09-22 PROCEDURE — 87635 SARS-COV-2 COVID-19 AMP PRB: CPT

## 2021-09-22 PROCEDURE — 71045 X-RAY EXAM CHEST 1 VIEW: CPT

## 2021-09-22 PROCEDURE — 94640 AIRWAY INHALATION TREATMENT: CPT

## 2021-09-22 PROCEDURE — 83735 ASSAY OF MAGNESIUM: CPT

## 2021-09-22 PROCEDURE — 36415 COLL VENOUS BLD VENIPUNCTURE: CPT

## 2021-09-22 PROCEDURE — 99285 EMERGENCY DEPT VISIT HI MDM: CPT

## 2021-09-22 PROCEDURE — 82570 ASSAY OF URINE CREATININE: CPT

## 2021-09-22 RX ORDER — INSULIN GLARGINE 100 [IU]/ML
60 INJECTION, SOLUTION SUBCUTANEOUS
Qty: 0 | Refills: 0 | DISCHARGE

## 2021-09-22 RX ORDER — METFORMIN HYDROCHLORIDE 850 MG/1
1 TABLET ORAL
Qty: 0 | Refills: 0 | DISCHARGE

## 2021-09-22 RX ORDER — CLOPIDOGREL BISULFATE 75 MG/1
1 TABLET, FILM COATED ORAL
Qty: 30 | Refills: 11
Start: 2021-09-22 | End: 2022-09-16

## 2021-09-22 RX ORDER — CARVEDILOL PHOSPHATE 80 MG/1
1 CAPSULE, EXTENDED RELEASE ORAL
Qty: 60 | Refills: 0
Start: 2021-09-22 | End: 2021-10-21

## 2021-09-22 RX ORDER — ATORVASTATIN CALCIUM 80 MG/1
1 TABLET, FILM COATED ORAL
Qty: 0 | Refills: 0 | DISCHARGE

## 2021-09-22 RX ORDER — INSULIN GLARGINE 100 [IU]/ML
50 INJECTION, SOLUTION SUBCUTANEOUS
Qty: 1 | Refills: 0
Start: 2021-09-22

## 2021-09-22 RX ORDER — ENOXAPARIN SODIUM 100 MG/ML
50 INJECTION SUBCUTANEOUS
Qty: 1 | Refills: 0
Start: 2021-09-22

## 2021-09-22 RX ORDER — LABETALOL HCL 100 MG
1 TABLET ORAL
Qty: 0 | Refills: 0 | DISCHARGE

## 2021-09-22 RX ORDER — ETODOLAC 400 MG/1
1 TABLET, FILM COATED ORAL
Qty: 0 | Refills: 0 | DISCHARGE

## 2021-09-22 RX ORDER — INSULIN LISPRO 100/ML
15 VIAL (ML) SUBCUTANEOUS
Qty: 1 | Refills: 0
Start: 2021-09-22 | End: 2021-10-21

## 2021-09-22 RX ORDER — ASPIRIN/CALCIUM CARB/MAGNESIUM 324 MG
1 TABLET ORAL
Qty: 30 | Refills: 11
Start: 2021-09-22 | End: 2022-09-16

## 2021-09-22 RX ORDER — ASPIRIN/CALCIUM CARB/MAGNESIUM 324 MG
1 TABLET ORAL
Qty: 0 | Refills: 0 | DISCHARGE

## 2021-09-22 RX ORDER — LOSARTAN POTASSIUM 100 MG/1
1 TABLET, FILM COATED ORAL
Qty: 0 | Refills: 0 | DISCHARGE

## 2021-09-22 RX ORDER — ATORVASTATIN CALCIUM 80 MG/1
1 TABLET, FILM COATED ORAL
Qty: 30 | Refills: 0
Start: 2021-09-22 | End: 2021-10-21

## 2021-09-22 RX ORDER — INSULIN LISPRO 100/ML
10 VIAL (ML) SUBCUTANEOUS
Qty: 0 | Refills: 0 | DISCHARGE

## 2021-09-22 RX ADMIN — Medication 10 UNIT(S): at 12:43

## 2021-09-22 RX ADMIN — Medication 81 MILLIGRAM(S): at 12:08

## 2021-09-22 RX ADMIN — CLOPIDOGREL BISULFATE 75 MILLIGRAM(S): 75 TABLET, FILM COATED ORAL at 12:08

## 2021-09-22 RX ADMIN — Medication 10 UNIT(S): at 07:55

## 2021-09-22 RX ADMIN — Medication 100 MILLIGRAM(S): at 13:29

## 2021-09-22 RX ADMIN — Medication 12: at 07:10

## 2021-09-22 RX ADMIN — BUDESONIDE AND FORMOTEROL FUMARATE DIHYDRATE 2 PUFF(S): 160; 4.5 AEROSOL RESPIRATORY (INHALATION) at 10:52

## 2021-09-22 RX ADMIN — Medication 100 MILLIGRAM(S): at 06:03

## 2021-09-22 RX ADMIN — AMLODIPINE BESYLATE 10 MILLIGRAM(S): 2.5 TABLET ORAL at 06:04

## 2021-09-22 RX ADMIN — CARVEDILOL PHOSPHATE 6.25 MILLIGRAM(S): 80 CAPSULE, EXTENDED RELEASE ORAL at 06:04

## 2021-09-22 RX ADMIN — HEPARIN SODIUM 5000 UNIT(S): 5000 INJECTION INTRAVENOUS; SUBCUTANEOUS at 13:29

## 2021-09-22 RX ADMIN — HEPARIN SODIUM 5000 UNIT(S): 5000 INJECTION INTRAVENOUS; SUBCUTANEOUS at 06:03

## 2021-09-22 NOTE — PROVIDER CONTACT NOTE (OTHER) - ACTION/TREATMENT ORDERED:
As per PA Plavac, 12 units lispro as per sliding scale to be administered, plus 6 units of nutritional lispro with food. Repeat finger stick at midnight and 3am.
As per BERRY Coleman, 40 units of Lantus to be administered. Will re-check fingerstick at 3am.
EKG performed, NP Reza made aware, no new orders, RN to continue to monitor

## 2021-09-22 NOTE — PROVIDER CONTACT NOTE (OTHER) - SITUATION
Fingerstick of 479, repeat fingerstick of 458
Finger stick of 474
Pt c/o 3/10 sharp chest pain while lying down

## 2021-09-22 NOTE — DISCHARGE NOTE NURSING/CASE MANAGEMENT/SOCIAL WORK - PATIENT PORTAL LINK FT
You can access the FollowMyHealth Patient Portal offered by [facility] by registering at the following website: [email or web address]. By joining [facility]’s FollowMyHealth portal, you will also be able to view your health information using other applications (apps) compatible with our system.

## 2021-09-22 NOTE — DISCHARGE NOTE NURSING/CASE MANAGEMENT/SOCIAL WORK - NSDCVIVACCINE_GEN_ALL_CORE_FT
Tdap; 28-Dec-2019 00:54; Herman Mcmahan (ROBERT); [employer]; [id number] (Exp. Date: 21-Oct-2022); IntraMuscular; Deltoid Right.; 0.5 milliLiter(s); VIS (VIS Published: 09-May-2013, VIS Presented: 28-Dec-2019);

## 2021-09-22 NOTE — PROGRESS NOTE ADULT - ATTENDING COMMENTS
Pt seen on rounds this afternoon prior to discharge.  Glucose john to 332 this morning despite 50 units of Lantus last night.  Pt initially denied any food intake overnight, but then remembered that he had a full sandwich and a can of non-diet soda after the 10 PM fingerstick.  He then dropped to 79 mg% after breakfast, but after receiving a large dose of sliding scale coverage.  Will increase the Lantus back to 60 units (his home dose) but have him increase the premeal at home to 15 units.  He indicates that he will return to see us as outpatient.  Prognosis for compliance is somewhat guarded, joyce in terms of diet.  I also have some concern about cognitive deficits.

## 2021-09-22 NOTE — PROVIDER CONTACT NOTE (OTHER) - ASSESSMENT
Pt c/o 3/10 sharp chest pain while lying down, pt states pain lasted for 1 minute after lying down. VSS.

## 2021-09-22 NOTE — PROGRESS NOTE ADULT - SUBJECTIVE AND OBJECTIVE BOX
HPI: 60 y/o M Current smoker; Fhx of CAD, with PMH of HTN, HLD, DM, COPD, hep C s/p Harvoni tx, JUANA (non-compliant with CPAP), depression  Who was BIBEMS to [facility] ED 21 with CC of near syncopal event in the train today. Pt. reports today he was in the train with his wife when he started to sweat profusely all of a sudden. He then got of the train and climbed 1 flight of subway stairs when he started to feel very weak, dizzy, SOB (felt like he was breathing from straw), diaphoretic/nauseous and felt like his chest getting crushed and felt like he was abt to pass out. His wife saw 2 police officers who called 911 for him. As per ER s/o received from EMS, pt hypotensive on scene and received  cc. Currently; pt. comfortable with no sx. He reports that one week ago; when he was trying to catch his bus; he had crushing chest tightness X 1 episode. Pt. denies any chest pain, SOB, dizziness, palpitation, N/V, LE edema, PND/orthopnea, syncope, fever, chills, recent travel and sick contact.  In ED, /71 HR 72 RR 18 T 97.9 02 98 RA, Labs revealed Trop T neg X 1, COVID negative, Cr. 1.70, CXR clear, EKG: NSR @ 67 bpm; TWI lead 2 and 3 (new TWI compared to EKG 2020).  In ED, Pt. received  mg PO X 1 and is now admitted to cardiology service for further management of chest pain.         Age at Dx: 45 yrs  How dx: Episode of Diaphrosis was diagnosed at [facility].  Hx and duration of insulin: 10 years  Current Therapy: Metformin 1000mg BID, Humalog 10 U Premeal.  No Hx of hypoglycemia  No Hx of DKA/HHS?    Home FSG: Can range from 150 to 250    Hx of other regimens  Complications: c/o Upper and lower extremity numbness and tingling. Pt is on Lyrica.  Outpatient Endo: [facility], but he is noon adherent. Medications are send by PCP.      FH:  DM: Mother  Thyroid: Sister (pt unsure if it is hyper or hypo)  Autoimmune: None  Other:    SH:  Smoking Active smoker since the past 40 Yrs 0.5 PPD  Recreational Drugs: None      Interval HPI 21:  Patient is anxious to go home today. Blood sugars elevated this morning, admits to eating after bedtime.  FSG & Insulin received:    Yesterday:  pre-dinner fs  nutritional lispro 10  units + 2  units lispro SS  bedtime fs  lantus  50 units +  4  units lispro SS    Today:  pre-breakfast fs  nutritional lispro 10  units+ 12   units lispro SS  pre-lunch fs  nutritional lispro 10  units+ 0  units lispro SS          Current Meds:  ALBUTerol    90 MICROgram(s) HFA Inhaler 2 Puff(s) Inhalation every 6 hours PRN  amLODIPine   Tablet 10 milliGRAM(s) Oral daily  aspirin enteric coated 81 milliGRAM(s) Oral daily  atorvastatin 80 milliGRAM(s) Oral at bedtime  budesonide  80 MICROgram(s)/formoterol 4.5 MICROgram(s) Inhaler 2 Puff(s) Inhalation two times a day  dextrose 40% Gel 15 Gram(s) Oral once  dextrose 5%. 1000 milliLiter(s) IV Continuous <Continuous>  dextrose 5%. 1000 milliLiter(s) IV Continuous <Continuous>  dextrose 50% Injectable 25 Gram(s) IV Push once  dextrose 50% Injectable 12.5 Gram(s) IV Push once  dextrose 50% Injectable 25 Gram(s) IV Push once  glucagon  Injectable 1 milliGRAM(s) IntraMuscular once  heparin   Injectable 5000 Unit(s) SubCutaneous every 8 hours  influenza   Vaccine 0.5 milliLiter(s) IntraMuscular once  insulin glargine Injectable (LANTUS) 40 Unit(s) SubCutaneous at bedtime  insulin lispro (ADMELOG) corrective regimen sliding scale   SubCutaneous Before meals and at bedtime  insulin lispro Injectable (ADMELOG) 6 Unit(s) SubCutaneous three times a day before meals  labetalol 300 milliGRAM(s) Oral two times a day  pregabalin 100 milliGRAM(s) Oral three times a day  sodium chloride 0.9%. 500 milliLiter(s) IV Continuous <Continuous>      Allergies:  No Known Allergies  Originally Entered as [Other - Mild] reaction to [Other][cantalope [ throat itchy and irritated]] (Other)      ROS:  Denies the following except as indicated.    General: weight loss/weight gain, decreased appetite, fatigue  Eyes: Blurry vision, double vision, visual changes  ENT: Throat pain, changes in voice,   CV: palpitations, SOB, CP, cough  GI: NVD, difficulty swallowing, abdominal pain  : polyuria, dysuria  Endo: abnormal menses, temperature intolerance, decreased libido  MSK: weakness, joint pain  Skin: rash, dryness, diaphoresis  Heme: Easy bruising,bleeding  Neuro: HA, dizziness, lightheadedness, numbness tingling  Psych: Anxiety, Depression    Vital Signs Last 24 Hrs  T(C): 36.5 (21 Sep 2021 10:08), Max: 36.8 (20 Sep 2021 19:50)  T(F): 97.7 (21 Sep 2021 10:08), Max: 98.2 (20 Sep 2021 19:50)  HR: 69 (21 Sep 2021 08:35) (63 - 84)  BP: 119/67 (21 Sep 2021 08:35) (110/61 - 160/85)  BP(mean): --  RR: 16 (21 Sep 2021 08:35) (16 - 18)  SpO2: 98% (21 Sep 2021 08:35) (93% - 99%)  Height (cm): 175.3 ( @ 15:56)  Weight (kg): 86.2 ( @ 15:56)  BMI (kg/m2): 28.1 (09- @ 15:56)      Constitutional: Overweight, NAD.   HEENT: NCAT, MMM, OP clear, EOMI, , no proptosis or lid retraction  Neck: no thyromegaly or palpable thyroid nodules   Respiratory: lungs CTAB.  Cardiovascular: regular rhythm, normal S1 and S2, no audible murmurs, no peripheral edema  GI: soft, NT/ND, no masses/HSM appreciated.  Neurology: no tremors, DTR 2+  Skin: no visible rashes/lesions  Psychiatric: AAO x 3, normal affect/mood.  Ext: radial pulses intact, DP pulses intact, extremities warm, no cyanosis, clubbing or edema.       LABS:                        13.0   8.34  )-----------( 239      ( 21 Sep 2021 06:50 )             38.2         137  |  101  |  28<H>  ----------------------------<  184<H>  3.9   |  23  |  1.71<H>    Ca    9.3      21 Sep 2021 06:50  Mg     2.2     09-21      PT/INR - ( 21 Sep 2021 06:50 )   PT: 10.6 sec;   INR: 0.88          PTT - ( 21 Sep 2021 06:50 )  PTT:28.7 sec  Urinalysis Basic - ( 21 Sep 2021 03:59 )    Color: Yellow / Appearance: Clear / SG: >=1.030 / pH: x  Gluc: x / Ketone: NEGATIVE  / Bili: Negative / Urobili: 0.2 E.U./dL   Blood: x / Protein: 100 mg/dL / Nitrite: NEGATIVE   Leuk Esterase: NEGATIVE / RBC: < 5 /HPF / WBC < 5 /HPF   Sq Epi: x / Non Sq Epi: 0-5 /HPF / Bacteria: Present /HPF            RADIOLOGY & ADDITIONAL STUDIES:  CAPILLARY BLOOD GLUCOSE      POCT Blood Glucose.: 200 mg/dL (21 Sep 2021 06:33)  POCT Blood Glucose.: 283 mg/dL (21 Sep 2021 02:58)  POCT Blood Glucose.: 458 mg/dL (21 Sep 2021 00:11)  POCT Blood Glucose.: 479 mg/dL (21 Sep 2021 00:07)  POCT Blood Glucose.: 474 mg/dL (20 Sep 2021 21:49)  POCT Blood Glucose.: 163 mg/dL (20 Sep 2021 16:04)        A/P:60 y/o M Current smoker; Fhx of CAD, with PMH of HTN, HLD, DM, COPD, hep C s/p Harvoni tx, JUANA (non-compliant with CPAP), depression, presents c/o near syncope and chest pain episode. Trop negative x 3. EKG w/ new TWi inferolaterally. Admitted to cardiac tele for r/o ACS.    1.  DMII- uncontrolled   On discharge   Please continue lantus 50 units at night.   Please continue lispro 15 units before each meal.      Pt's fingerstick glucose goal is 100-180 mg/dL      Pt can follow up at discharge with [facility] by calling  to make an appointment.   Will discuss case with     and update primary team

## 2021-10-01 DIAGNOSIS — Z82.49 FAMILY HISTORY OF ISCHEMIC HEART DISEASE AND OTHER DISEASES OF THE CIRCULATORY SYSTEM: ICD-10-CM

## 2021-10-01 DIAGNOSIS — B19.20 UNSPECIFIED VIRAL HEPATITIS C WITHOUT HEPATIC COMA: ICD-10-CM

## 2021-10-01 DIAGNOSIS — Z91.018 ALLERGY TO OTHER FOODS: ICD-10-CM

## 2021-10-01 DIAGNOSIS — Z96.0 PRESENCE OF UROGENITAL IMPLANTS: ICD-10-CM

## 2021-10-01 DIAGNOSIS — F17.210 NICOTINE DEPENDENCE, CIGARETTES, UNCOMPLICATED: ICD-10-CM

## 2021-10-01 DIAGNOSIS — Z83.3 FAMILY HISTORY OF DIABETES MELLITUS: ICD-10-CM

## 2021-10-01 DIAGNOSIS — I24.9 ACUTE ISCHEMIC HEART DISEASE, UNSPECIFIED: ICD-10-CM

## 2021-10-01 DIAGNOSIS — J44.9 CHRONIC OBSTRUCTIVE PULMONARY DISEASE, UNSPECIFIED: ICD-10-CM

## 2021-10-01 DIAGNOSIS — G47.33 OBSTRUCTIVE SLEEP APNEA (ADULT) (PEDIATRIC): ICD-10-CM

## 2021-10-01 DIAGNOSIS — Z79.82 LONG TERM (CURRENT) USE OF ASPIRIN: ICD-10-CM

## 2021-10-01 DIAGNOSIS — E11.40 TYPE 2 DIABETES MELLITUS WITH DIABETIC NEUROPATHY, UNSPECIFIED: ICD-10-CM

## 2021-10-01 DIAGNOSIS — E78.5 HYPERLIPIDEMIA, UNSPECIFIED: ICD-10-CM

## 2021-10-01 DIAGNOSIS — Z79.4 LONG TERM (CURRENT) USE OF INSULIN: ICD-10-CM

## 2021-10-01 DIAGNOSIS — E11.65 TYPE 2 DIABETES MELLITUS WITH HYPERGLYCEMIA: ICD-10-CM

## 2021-10-01 DIAGNOSIS — F32.9 MAJOR DEPRESSIVE DISORDER, SINGLE EPISODE, UNSPECIFIED: ICD-10-CM

## 2021-10-01 DIAGNOSIS — Z79.51 LONG TERM (CURRENT) USE OF INHALED STEROIDS: ICD-10-CM

## 2021-10-01 DIAGNOSIS — R55 SYNCOPE AND COLLAPSE: ICD-10-CM

## 2021-10-01 DIAGNOSIS — Z91.19 PATIENT'S NONCOMPLIANCE WITH OTHER MEDICAL TREATMENT AND REGIMEN: ICD-10-CM

## 2021-10-01 DIAGNOSIS — N17.9 ACUTE KIDNEY FAILURE, UNSPECIFIED: ICD-10-CM

## 2021-10-01 DIAGNOSIS — I25.110 ATHEROSCLEROTIC HEART DISEASE OF NATIVE CORONARY ARTERY WITH UNSTABLE ANGINA PECTORIS: ICD-10-CM

## 2021-10-01 DIAGNOSIS — I10 ESSENTIAL (PRIMARY) HYPERTENSION: ICD-10-CM

## 2021-12-01 PROCEDURE — G9005: CPT

## 2021-12-06 NOTE — ED ADULT NURSE NOTE - NS ED NOTE  TALK SOMEONE YN
From: Robert Gagnon  To: Pauline Lund  Sent: 12/5/2021 10:19 PM CST  Subject: Lamictal dosage    This message is being sent by Monique Horner on behalf of Robert Gagnon.    Gregory Carpenter Robert is doing really well with his meds but we are having a problem. Every time he takes the 200mg tab of Lamictal he dry heaves.  I asked the pharmacist to show us the size difference between the 200mg and 100mg pills and we know he can easily swallow the 100. We have tried cutting with a pill cutter and them he gags on the sharp edges. Can you prescribe 60 100mg tablets so he can take two of those per day rather than 1 200mg pill? Its getting to the point where some days he just entirely refuses to take the pill because he hates being gaggy. Let me know!  Thanks  Monique   
See Mother's message below and RN response.  New prescription sent using 2 of the 100  Daily.    Routing FYI to Pauline Lund. NEHAL  
No

## 2022-01-07 NOTE — ED PROVIDER NOTE - CARDIAC, MLM
Normal rate, regular rhythm.  Heart sounds S1, S2.  No murmurs, rubs or gallops. High Dose Vitamin A Pregnancy And Lactation Text: High dose vitamin A therapy is contraindicated during pregnancy and breast feeding.

## 2022-03-21 NOTE — ED PROVIDER NOTE - DOMESTIC TRAVEL HIGH RISK QUESTION
OK for refill.   ES
No

## 2024-01-10 NOTE — H&P ADULT - PROBLEM SELECTOR PLAN 8
Detail Level: Simple Instructions: This plan will send the code FBSE to the PM system.  DO NOT or CHANGE the price. Price (Do Not Change): 0.00 F NONE   E REPLETE   N: DASH TLC CONSISTENT CARB  D: RMF

## 2024-10-08 NOTE — ED PROVIDER NOTE - ENMT NEGATIVE STATEMENT, MLM
Detail Level: Detailed
X Size Of Lesion In Cm (Optional): 0
Ears: no ear pain and no hearing problems.Nose: no nasal congestion and no nasal drainage.Mouth/Throat: no dysphagia, no hoarseness and no throat pain.Neck: no lumps, no pain, no stiffness and no swollen glands.